# Patient Record
Sex: FEMALE | Race: BLACK OR AFRICAN AMERICAN | Employment: OTHER
[De-identification: names, ages, dates, MRNs, and addresses within clinical notes are randomized per-mention and may not be internally consistent; named-entity substitution may affect disease eponyms.]

---

## 2022-09-27 ENCOUNTER — OFFICE VISIT (OUTPATIENT)
Dept: FAMILY MEDICINE CLINIC | Facility: CLINIC | Age: 37
End: 2022-09-27
Payer: MEDICARE

## 2022-09-27 VITALS
WEIGHT: 171 LBS | HEART RATE: 69 BPM | DIASTOLIC BLOOD PRESSURE: 78 MMHG | HEIGHT: 67 IN | SYSTOLIC BLOOD PRESSURE: 112 MMHG | TEMPERATURE: 97.3 F | OXYGEN SATURATION: 99 % | BODY MASS INDEX: 26.84 KG/M2

## 2022-09-27 DIAGNOSIS — E20.9 HYPOPARATHYROIDISM, UNSPECIFIED HYPOPARATHYROIDISM TYPE (HCC): ICD-10-CM

## 2022-09-27 DIAGNOSIS — G89.29 OTHER CHRONIC PAIN: ICD-10-CM

## 2022-09-27 DIAGNOSIS — G35 MULTIPLE SCLEROSIS (HCC): Primary | ICD-10-CM

## 2022-09-27 DIAGNOSIS — E03.9 HYPOTHYROIDISM, UNSPECIFIED TYPE: ICD-10-CM

## 2022-09-27 DIAGNOSIS — M25.559 HIP PAIN: ICD-10-CM

## 2022-09-27 DIAGNOSIS — R56.9 SEIZURES (HCC): ICD-10-CM

## 2022-09-27 PROCEDURE — 99204 OFFICE O/P NEW MOD 45 MIN: CPT | Performed by: FAMILY MEDICINE

## 2022-09-27 PROCEDURE — G8419 CALC BMI OUT NRM PARAM NOF/U: HCPCS | Performed by: FAMILY MEDICINE

## 2022-09-27 PROCEDURE — G8427 DOCREV CUR MEDS BY ELIG CLIN: HCPCS | Performed by: FAMILY MEDICINE

## 2022-09-27 PROCEDURE — 1036F TOBACCO NON-USER: CPT | Performed by: FAMILY MEDICINE

## 2022-09-27 RX ORDER — LEVETIRACETAM 500 MG/1
500 TABLET ORAL 2 TIMES DAILY
Qty: 60 TABLET | Refills: 3 | Status: SHIPPED | OUTPATIENT
Start: 2022-09-27

## 2022-09-27 RX ORDER — DIPHENHYDRAMINE HCL 25 MG
CAPSULE ORAL
COMMUNITY
Start: 2015-03-29

## 2022-09-27 RX ORDER — CALCITRIOL 0.25 UG/1
0.5 CAPSULE, LIQUID FILLED ORAL 2 TIMES DAILY
Qty: 120 CAPSULE | Refills: 3 | Status: SHIPPED | OUTPATIENT
Start: 2022-09-27

## 2022-09-27 RX ORDER — ZOLPIDEM TARTRATE 10 MG/1
TABLET ORAL
COMMUNITY

## 2022-09-27 RX ORDER — ONDANSETRON 4 MG/1
TABLET, ORALLY DISINTEGRATING ORAL
COMMUNITY
End: 2022-10-17 | Stop reason: SDUPTHER

## 2022-09-27 RX ORDER — CALCIUM CARBONATE/VITAMIN D3 500 MG-600
TABLET,CHEWABLE ORAL
COMMUNITY

## 2022-09-27 RX ORDER — CALCITRIOL 0.25 UG/1
0.5 CAPSULE, LIQUID FILLED ORAL 2 TIMES DAILY
COMMUNITY
Start: 2022-08-06 | End: 2022-09-27 | Stop reason: SDUPTHER

## 2022-09-27 RX ORDER — TIZANIDINE 4 MG/1
4 TABLET ORAL NIGHTLY PRN
COMMUNITY
Start: 2021-11-23 | End: 2022-09-27 | Stop reason: SDUPTHER

## 2022-09-27 RX ORDER — TIZANIDINE 4 MG/1
4 TABLET ORAL EVERY 8 HOURS PRN
Qty: 30 TABLET | Refills: 1 | Status: SHIPPED | OUTPATIENT
Start: 2022-09-27

## 2022-09-27 RX ORDER — LEVOTHYROXINE SODIUM 112 UG/1
112 TABLET ORAL DAILY
Qty: 30 TABLET | Refills: 5 | Status: SHIPPED | OUTPATIENT
Start: 2022-09-27

## 2022-09-27 RX ORDER — OXYCODONE HYDROCHLORIDE 10 MG/1
10 TABLET ORAL
COMMUNITY
Start: 2021-11-23

## 2022-09-27 RX ORDER — PSEUDOEPHEDRINE HCL 30 MG
TABLET ORAL
COMMUNITY
Start: 2015-03-29

## 2022-09-27 RX ORDER — LEVETIRACETAM 500 MG/1
500 TABLET ORAL 2 TIMES DAILY
COMMUNITY
Start: 2022-08-06 | End: 2022-09-27 | Stop reason: SDUPTHER

## 2022-09-27 RX ORDER — ERGOCALCIFEROL (VITAMIN D2) 1250 MCG
CAPSULE ORAL
COMMUNITY

## 2022-09-27 RX ORDER — LEVOTHYROXINE SODIUM 112 UG/1
112 TABLET ORAL DAILY
COMMUNITY
Start: 2021-08-17 | End: 2022-09-27 | Stop reason: SDUPTHER

## 2022-09-27 ASSESSMENT — PATIENT HEALTH QUESTIONNAIRE - PHQ9
SUM OF ALL RESPONSES TO PHQ QUESTIONS 1-9: 0
1. LITTLE INTEREST OR PLEASURE IN DOING THINGS: 0
SUM OF ALL RESPONSES TO PHQ QUESTIONS 1-9: 0
2. FEELING DOWN, DEPRESSED OR HOPELESS: 0
SUM OF ALL RESPONSES TO PHQ QUESTIONS 1-9: 0
SUM OF ALL RESPONSES TO PHQ QUESTIONS 1-9: 0
SUM OF ALL RESPONSES TO PHQ9 QUESTIONS 1 & 2: 0

## 2022-09-27 ASSESSMENT — ENCOUNTER SYMPTOMS
SHORTNESS OF BREATH: 0
COUGH: 0
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
## 2022-09-28 ENCOUNTER — TELEPHONE (OUTPATIENT)
Dept: FAMILY MEDICINE CLINIC | Facility: CLINIC | Age: 37
End: 2022-09-28

## 2022-10-12 ENCOUNTER — TELEPHONE (OUTPATIENT)
Dept: FAMILY MEDICINE CLINIC | Facility: CLINIC | Age: 37
End: 2022-10-12

## 2022-10-12 DIAGNOSIS — G89.29 OTHER CHRONIC PAIN: Primary | ICD-10-CM

## 2022-10-12 NOTE — TELEPHONE ENCOUNTER
----- Message from Gayla Fox sent at 10/11/2022 11:44 AM EDT -----  Subject: Message to Provider    QUESTIONS  Information for Provider? Patient is wanting a referral for [facility]   comprehensive pain in [place], their number is [phone] and their   fax number is [phone]. Patient already has a referral but is   wanting a new referral because the other referral is not covered by her   insurance. Please call patient back to discuss. ---------------------------------------------------------------------------  --------------  Juan ALONSO  [phone]; OK to leave message on voicemail  ---------------------------------------------------------------------------  --------------  SCRIPT ANSWERS  Relationship to Patient?  Self

## 2022-10-12 NOTE — TELEPHONE ENCOUNTER
----- Message from Jerome Leroy sent at 10/11/2022 11:44 AM EDT -----  Subject: Message to Provider    QUESTIONS  Information for Provider? Patient is wanting a referral for Mio Stevenson   [facility] in [place], their number is [phone] and their   fax number is [phone]. Patient already has a referral but is   wanting a new referral because the other referral is not covered by her   insurance. Please call patient back to discuss. ---------------------------------------------------------------------------  --------------  Mike ROSE  [phone]; OK to leave message on voicemail  ---------------------------------------------------------------------------  --------------  SCRIPT ANSWERS  Relationship to Patient?  Self

## 2022-10-14 ENCOUNTER — TELEMEDICINE (OUTPATIENT)
Dept: FAMILY MEDICINE CLINIC | Facility: CLINIC | Age: 37
End: 2022-10-14
Payer: MEDICARE
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
## 2022-10-14 PROCEDURE — 99214 OFFICE O/P EST MOD 30 MIN: CPT | Performed by: NURSE PRACTITIONER

## 2022-10-14 RX ORDER — METHOCARBAMOL 500 MG/1
500 TABLET, FILM COATED ORAL 4 TIMES DAILY
Qty: 40 TABLET | Refills: 0 | Status: CANCELLED | OUTPATIENT
Start: 2022-10-14 | End: 2022-10-24

## 2022-10-14 RX ORDER — CYCLOBENZAPRINE HCL 10 MG
10 TABLET ORAL 3 TIMES DAILY PRN
Qty: 20 TABLET | Refills: 0 | Status: SHIPPED | OUTPATIENT
Start: 2022-10-14 | End: 2022-10-24

## 2022-10-14 ASSESSMENT — ENCOUNTER SYMPTOMS
SHORTNESS OF BREATH: 0
COUGH: 0
BACK PAIN: 0
CONSTIPATION: 0
DIARRHEA: 0
ABDOMINAL PAIN: 0
SORE THROAT: 0
VOMITING: 0
NAUSEA: 0
EYE PAIN: 0
SINUS PAIN: 0
WHEEZING: 0

## 2022-10-14 ASSESSMENT — PATIENT HEALTH QUESTIONNAIRE - PHQ9
SUM OF ALL RESPONSES TO PHQ QUESTIONS 1-9: 0
1. LITTLE INTEREST OR PLEASURE IN DOING THINGS: 0
SUM OF ALL RESPONSES TO PHQ9 QUESTIONS 1 & 2: 0
SUM OF ALL RESPONSES TO PHQ QUESTIONS 1-9: 0
2. FEELING DOWN, DEPRESSED OR HOPELESS: 0
SUM OF ALL RESPONSES TO PHQ QUESTIONS 1-9: 0
SUM OF ALL RESPONSES TO PHQ QUESTIONS 1-9: 0
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
## 2022-10-14 NOTE — Clinical Note
Patient will likely ask for narcotics on Monday. You may want to look at her ER visit at [facility] 10/6/22.   FAM Eaton - CNP

## 2022-10-17 ENCOUNTER — OFFICE VISIT (OUTPATIENT)
Dept: FAMILY MEDICINE CLINIC | Facility: CLINIC | Age: 37
End: 2022-10-17
Payer: MEDICARE

## 2022-10-17 VITALS
DIASTOLIC BLOOD PRESSURE: 78 MMHG | BODY MASS INDEX: 26.49 KG/M2 | RESPIRATION RATE: 14 BRPM | OXYGEN SATURATION: 100 % | HEART RATE: 67 BPM | SYSTOLIC BLOOD PRESSURE: 110 MMHG | HEIGHT: 67 IN | TEMPERATURE: 98.7 F | WEIGHT: 168.8 LBS

## 2022-10-17 DIAGNOSIS — R93.7 ABNORMAL MRI, LUMBAR SPINE: ICD-10-CM

## 2022-10-17 DIAGNOSIS — G89.29 OTHER CHRONIC PAIN: ICD-10-CM

## 2022-10-17 DIAGNOSIS — E20.9 HYPOPARATHYROIDISM, UNSPECIFIED HYPOPARATHYROIDISM TYPE (HCC): ICD-10-CM

## 2022-10-17 DIAGNOSIS — Z12.4 CERVICAL CANCER SCREENING: ICD-10-CM

## 2022-10-17 DIAGNOSIS — R11.0 NAUSEA: ICD-10-CM

## 2022-10-17 DIAGNOSIS — H57.89 IRRITATION OF RIGHT EYE: Primary | ICD-10-CM

## 2022-10-17 DIAGNOSIS — R91.8 MULTIPLE LUNG NODULES ON CT: ICD-10-CM

## 2022-10-17 PROCEDURE — 99215 OFFICE O/P EST HI 40 MIN: CPT | Performed by: FAMILY MEDICINE

## 2022-10-17 RX ORDER — ONDANSETRON 4 MG/1
TABLET, ORALLY DISINTEGRATING ORAL
Qty: 20 TABLET | Refills: 0 | Status: SHIPPED | OUTPATIENT
Start: 2022-10-17 | End: 2022-10-17 | Stop reason: SDUPTHER

## 2022-10-17 RX ORDER — ONDANSETRON 4 MG/1
4 TABLET, ORALLY DISINTEGRATING ORAL EVERY 8 HOURS PRN
Qty: 20 TABLET | Refills: 0 | Status: SHIPPED | OUTPATIENT
Start: 2022-10-17

## 2022-10-17 ASSESSMENT — PATIENT HEALTH QUESTIONNAIRE - PHQ9
SUM OF ALL RESPONSES TO PHQ9 QUESTIONS 1 & 2: 0
2. FEELING DOWN, DEPRESSED OR HOPELESS: 0
SUM OF ALL RESPONSES TO PHQ QUESTIONS 1-9: 0
1. LITTLE INTEREST OR PLEASURE IN DOING THINGS: 0
SUM OF ALL RESPONSES TO PHQ QUESTIONS 1-9: 0

## 2022-10-17 ASSESSMENT — ANXIETY QUESTIONNAIRES
2. NOT BEING ABLE TO STOP OR CONTROL WORRYING: 0
GAD7 TOTAL SCORE: 0
3. WORRYING TOO MUCH ABOUT DIFFERENT THINGS: 0
5. BEING SO RESTLESS THAT IT IS HARD TO SIT STILL: 0
7. FEELING AFRAID AS IF SOMETHING AWFUL MIGHT HAPPEN: 0
4. TROUBLE RELAXING: 0
6. BECOMING EASILY ANNOYED OR IRRITABLE: 0
1. FEELING NERVOUS, ANXIOUS, OR ON EDGE: 0

## 2022-10-18 ENCOUNTER — TELEPHONE (OUTPATIENT)
Dept: FAMILY MEDICINE CLINIC | Facility: CLINIC | Age: 37
End: 2022-10-18

## 2022-10-18 NOTE — TELEPHONE ENCOUNTER
----- Message from Asif Doc sent at 10/18/2022  8:59 AM EDT -----  Subject: Message to Provider    QUESTIONS  Information for Provider? Patient is calling and needs a call back to   discuss the 2 referrals she has for pain management. Please call back to   as soon as possible  ---------------------------------------------------------------------------  --------------  [id number] [employer]  [phone]; OK to leave message on voicemail  ---------------------------------------------------------------------------  --------------  SCRIPT ANSWERS  Relationship to Patient?  Self

## 2022-10-18 NOTE — TELEPHONE ENCOUNTER
----- Message from Diamond Whitaker sent at 10/18/2022  8:59 AM EDT -----  Subject: Message to Provider    QUESTIONS  Information for Provider? Patient is calling and needs a call back to   discuss the 2 referrals she has for pain management. Please call back to   as soon as possible  ---------------------------------------------------------------------------  --------------  [id number] [employer]  [phone]; OK to leave message on voicemail  ---------------------------------------------------------------------------  --------------  SCRIPT ANSWERS  Relationship to Patient?  Self
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
## 2022-10-27 LAB
CYTOLOGIST CVX/VAG CYTO: NORMAL
HPV OTHER HR TYPES: NEGATIVE
HPV TYPE 16: NEGATIVE
HPV TYPE 18: NEGATIVE
PATH REPORT.FINAL DX SPEC: NORMAL
STAT OF ADQ CVX/VAG CYTO-IMP: NORMAL

## 2022-11-08 ENCOUNTER — TELEPHONE (OUTPATIENT)
Dept: FAMILY MEDICINE CLINIC | Facility: CLINIC | Age: 37
End: 2022-11-08

## 2022-11-08 NOTE — TELEPHONE ENCOUNTER
Patient's friend had a [employer] message  stating that she was having seizures several times a day. She was informed to take the patient to the hospital.      The patient refused to go to the [place] [address].       The patient's friend stating on a [employer] message to make appointment. A vm and [employer] message was left for the patient and the patient's friend to call the office to make an appointment.

## 2022-12-22 ENCOUNTER — TELEPHONE (OUTPATIENT)
Dept: FAMILY MEDICINE CLINIC | Facility: CLINIC | Age: 37
End: 2022-12-22

## 2022-12-22 NOTE — TELEPHONE ENCOUNTER
Patient had a friend calling regarding the patient's seizures. .   Patient was informed via [employer] to make an appointment. Her Pain doctor stated she needed more calcium and vitamin D3  She has been taking this medication and the seizure have subsided.

## 2022-12-22 NOTE — TELEPHONE ENCOUNTER
Patient states that her pain doctor found a nodule on her back. She states that her toes are now numb on the left side. She is inquiring if she needs to go to ortho. Also wants a dental surgeon. Appt made on 12/28/22 with Dr. Nirali Martinez.

## 2023-05-01 DIAGNOSIS — E20.9 HYPOPARATHYROIDISM, UNSPECIFIED HYPOPARATHYROIDISM TYPE (HCC): ICD-10-CM

## 2023-05-01 RX ORDER — CALCITRIOL 0.25 UG/1
0.5 CAPSULE, LIQUID FILLED ORAL 2 TIMES DAILY
Qty: 120 CAPSULE | Refills: 0 | Status: SHIPPED | OUTPATIENT
Start: 2023-05-01

## 2023-05-01 NOTE — TELEPHONE ENCOUNTER
Pt is requesting a refill calcitRIOL ( ROCALTROL) 0.25    Pharmacy [facility]

## 2023-10-03 ENCOUNTER — TELEMEDICINE (OUTPATIENT)
Dept: FAMILY MEDICINE CLINIC | Facility: CLINIC | Age: 38
End: 2023-10-03

## 2023-10-03 DIAGNOSIS — R56.9 SEIZURES (HCC): ICD-10-CM

## 2023-10-03 DIAGNOSIS — R91.8 MULTIPLE LUNG NODULES ON CT: ICD-10-CM

## 2023-10-03 DIAGNOSIS — G35 MULTIPLE SCLEROSIS (HCC): Primary | ICD-10-CM

## 2023-10-03 DIAGNOSIS — E20.9 HYPOPARATHYROIDISM, UNSPECIFIED HYPOPARATHYROIDISM TYPE (HCC): ICD-10-CM

## 2023-10-03 PROCEDURE — 99213 OFFICE O/P EST LOW 20 MIN: CPT | Performed by: FAMILY MEDICINE

## 2023-10-03 RX ORDER — CALCITRIOL 0.25 UG/1
0.5 CAPSULE, LIQUID FILLED ORAL 2 TIMES DAILY
Qty: 360 CAPSULE | Refills: 5 | Status: SHIPPED | OUTPATIENT
Start: 2023-10-03

## 2023-10-03 RX ORDER — CALCITRIOL 0.25 UG/1
0.5 CAPSULE, LIQUID FILLED ORAL 2 TIMES DAILY
Qty: 360 CAPSULE | Refills: 5 | Status: SHIPPED | OUTPATIENT
Start: 2023-10-03 | End: 2023-10-03

## 2023-10-03 ASSESSMENT — ENCOUNTER SYMPTOMS
EYES NEGATIVE: 1
RESPIRATORY NEGATIVE: 1
ALLERGIC/IMMUNOLOGIC NEGATIVE: 1
GASTROINTESTINAL NEGATIVE: 1

## 2023-10-03 NOTE — PROGRESS NOTES
equal, round, and reactive to light. Cardiovascular:      Pulses: Normal pulses. Pulmonary:      Effort: Pulmonary effort is normal.   Musculoskeletal:         General: Normal range of motion. Cervical back: Normal range of motion and neck supple. Skin:     General: Skin is warm and dry. Neurological:      General: No focal deficit present. Mental Status: She is alert and oriented to person, place, and time. Psychiatric:         Mood and Affect: Mood normal.         On this date 10/03/23  I have spent 20 minutes reviewing previous notes, test results and face to face with the patient discussing the diagnosis and importance of compliance with the treatment plan as well as documenting on the day of the visit. Ankita Hermosillo is being evaluated by a Virtual Visit (video visit) encounter to address concerns as mentioned above. A caregiver was present when appropriate. Due to this being a TeleHealth encounter (During AXAMZ-40 public health emergency), evaluation of the following organ systems was limited: Vitals/Constitutional/EENT/Resp/CV/GI//MS/Neuro/Skin/Heme-Lymph-Imm. Pursuant to the emergency declaration under the [address] and the "[employer] and [employer] Act, this Virtual Visit was conducted with patient's (and/or legal guardian's) consent, to reduce the patient's risk of exposure to COVID-19 and provide necessary medical care. The patient (and/or legal guardian) has also been advised to contact this office for worsening conditions or problems, and seek emergency medical treatment and/or call 911 if deemed necessary. Patient identification was verified at the start of the visit: Yes    Services were provided through a video synchronous discussion virtually to substitute for in-person clinic visit. Patient and provider were located at their individual homes.     An electronic

## 2023-11-28 ENCOUNTER — TELEPHONE (OUTPATIENT)
Dept: FAMILY MEDICINE CLINIC | Facility: CLINIC | Age: 38
End: 2023-11-28
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
## 2023-11-28 NOTE — TELEPHONE ENCOUNTER
Patient was called and informed that the visit is a physical and those cannot be done virtually. She was also informed that there appointment was on 12/19/23 at 3:30 pm.     Patient voiced understanding.

## 2024-12-11 ENCOUNTER — TELEPHONE (OUTPATIENT)
Dept: FAMILY MEDICINE CLINIC | Facility: CLINIC | Age: 39
End: 2024-12-11

## 2024-12-11 NOTE — TELEPHONE ENCOUNTER
----- Message from Radha SARINA sent at 12/9/2024 10:17 AM EST -----  Regarding: [facility] Appointment Request  E[facility] Appointment Request    Patient needs appointment for [facility] Appointment Type: Existing Condition Follow Up.    Patient Requested Dates(s): As soon as possible.   Patient Requested Time: After 10 AM.   Provider Name: Dian Jeffries MD    Reason for Appointment Request: Established Patient - Available appointments did not meet patient need    Referral for Pain Management, also obtaining other information from her PCP.   --------------------------------------------------------------------------------------------------------------------------    Relationship to Patient: Self     Call Back Information: OK to leave message on voicemail  Preferred Call Back Number: Phone  [phone]

## 2024-12-13 ENCOUNTER — TELEPHONE (OUTPATIENT)
Dept: FAMILY MEDICINE CLINIC | Facility: CLINIC | Age: 39
End: 2024-12-13

## 2024-12-13 NOTE — TELEPHONE ENCOUNTER
VM left for the patient to make an appointment by calling the office ore through [employer].  Message also sent through [employer].

## 2024-12-13 NOTE — TELEPHONE ENCOUNTER
----- Message from Radha SARINA sent at 12/9/2024 10:17 AM EST -----  Regarding: ECC Appointment Request  ECC Appointment Request    Patient needs appointment for ECC Appointment Type: Existing Condition Follow Up.    Patient Requested Dates(s): As soon as possible.   Patient Requested Time: After 10 AM.   Provider Name: Dian Jeffries MD    Reason for Appointment Request: Established Patient - Available appointments did not meet patient need    Referral for Pain Management, also obtaining other information from her PCP.   --------------------------------------------------------------------------------------------------------------------------    Relationship to Patient: Self     Call Back Information: OK to leave message on voicemail  Preferred Call Back Number: Phone  [phone]

## 2024-12-17 ENCOUNTER — TELEPHONE (OUTPATIENT)
Dept: FAMILY MEDICINE CLINIC | Facility: CLINIC | Age: 39
End: 2024-12-17

## 2024-12-17 NOTE — TELEPHONE ENCOUNTER
Tried to call the patient.  VM is full.  Mychart left for the patient to make an appointment by calling the office or through [email or web address].

## 2024-12-17 NOTE — TELEPHONE ENCOUNTER
----- Message from Radha SARINA sent at 12/9/2024 10:17 AM EST -----  Regarding: ECC Appointment Request  ECC Appointment Request    Patient needs appointment for ECC Appointment Type: Existing Condition Follow Up.    Patient Requested Dates(s): As soon as possible.   Patient Requested Time: After 10 AM.   Provider Name: Dian Jeffries MD    Reason for Appointment Request: Established Patient - Available appointments did not meet patient need    Referral for Pain Management, also obtaining other information from her PCP.   --------------------------------------------------------------------------------------------------------------------------    Relationship to Patient: Self     Call Back Information: OK to leave message on voicemail  Preferred Call Back Number: Phone  [phone]

## 2024-12-27 ENCOUNTER — TELEPHONE (OUTPATIENT)
Dept: FAMILY MEDICINE CLINIC | Facility: CLINIC | Age: 39
End: 2024-12-27

## 2024-12-27 NOTE — TELEPHONE ENCOUNTER
Patient has an appt with you on 12/31/24.  She has been having seizures. Went to the er. She has been out fo her calcium and thinks it might be her calcium levels.       Calcitrol 0.25 mcg 2 caps two times a day.  Last filled on 10/03/23 with 260 and 5 refills.      She should have until her appointment with the provider.      MA called the patient to see if she had enough.  VM full.

## 2025-01-03 DIAGNOSIS — E20.9 HYPOPARATHYROIDISM, UNSPECIFIED HYPOPARATHYROIDISM TYPE (HCC): ICD-10-CM

## 2025-01-03 RX ORDER — CALCITRIOL 0.25 UG/1
0.5 CAPSULE, LIQUID FILLED ORAL 2 TIMES DAILY
Qty: 360 CAPSULE | Refills: 5 | Status: SHIPPED | OUTPATIENT
Start: 2025-01-03

## 2025-01-06 ENCOUNTER — OFFICE VISIT (OUTPATIENT)
Dept: FAMILY MEDICINE CLINIC | Facility: CLINIC | Age: 40
End: 2025-01-06

## 2025-01-06 VITALS
BODY MASS INDEX: 42.91 KG/M2 | TEMPERATURE: 98.3 F | SYSTOLIC BLOOD PRESSURE: 114 MMHG | WEIGHT: 273.38 LBS | OXYGEN SATURATION: 99 % | HEIGHT: 67 IN | DIASTOLIC BLOOD PRESSURE: 84 MMHG | HEART RATE: 84 BPM

## 2025-01-06 DIAGNOSIS — R63.5 WEIGHT GAIN: ICD-10-CM

## 2025-01-06 DIAGNOSIS — Z98.890 HISTORY OF PARATHYROIDECTOMY: ICD-10-CM

## 2025-01-06 DIAGNOSIS — R91.8 MULTIPLE PULMONARY NODULES DETERMINED BY COMPUTED TOMOGRAPHY OF LUNG: ICD-10-CM

## 2025-01-06 DIAGNOSIS — R79.89 ELEVATED TSH: ICD-10-CM

## 2025-01-06 DIAGNOSIS — E89.0 HISTORY OF TOTAL THYROIDECTOMY: ICD-10-CM

## 2025-01-06 DIAGNOSIS — G89.21 CHRONIC PAIN DUE TO TRAUMA: ICD-10-CM

## 2025-01-06 DIAGNOSIS — Z90.89 HISTORY OF PARATHYROIDECTOMY: ICD-10-CM

## 2025-01-06 DIAGNOSIS — E89.0 POSTOPERATIVE HYPOTHYROIDISM: ICD-10-CM

## 2025-01-06 DIAGNOSIS — G35 MULTIPLE SCLEROSIS (HCC): ICD-10-CM

## 2025-01-06 DIAGNOSIS — E83.51 HYPOCALCEMIA: Primary | ICD-10-CM

## 2025-01-06 PROBLEM — R89.8 ABNORMAL BONE MARROW EXAMINATION: Status: ACTIVE | Noted: 2023-05-23

## 2025-01-06 LAB
ALBUMIN SERPL-MCNC: 3.9 G/DL (ref 3.5–5)
ALBUMIN/GLOB SERPL: 1 (ref 1–1.9)
ALP SERPL-CCNC: 87 U/L (ref 35–104)
ALT SERPL-CCNC: 11 U/L (ref 8–45)
ANION GAP SERPL CALC-SCNC: 13 MMOL/L (ref 7–16)
AST SERPL-CCNC: 27 U/L (ref 15–37)
BILIRUB SERPL-MCNC: 0.3 MG/DL (ref 0–1.2)
BUN SERPL-MCNC: 11 MG/DL (ref 6–23)
CALCIUM SERPL-MCNC: 7.4 MG/DL (ref 8.8–10.2)
CHLORIDE SERPL-SCNC: 99 MMOL/L (ref 98–107)
CO2 SERPL-SCNC: 27 MMOL/L (ref 20–29)
CREAT SERPL-MCNC: 1.08 MG/DL (ref 0.6–1.1)
GLOBULIN SER CALC-MCNC: 4 G/DL (ref 2.3–3.5)
GLUCOSE SERPL-MCNC: 99 MG/DL (ref 70–99)
POTASSIUM SERPL-SCNC: 4.2 MMOL/L (ref 3.5–5.1)
PROT SERPL-MCNC: 7.8 G/DL (ref 6.3–8.2)
SODIUM SERPL-SCNC: 139 MMOL/L (ref 136–145)
TSH W FREE THYROID IF ABNORMAL: 3.41 UIU/ML (ref 0.27–4.2)

## 2025-01-06 PROCEDURE — 99215 OFFICE O/P EST HI 40 MIN: CPT

## 2025-01-06 RX ORDER — CYCLOBENZAPRINE HCL 10 MG
TABLET ORAL
COMMUNITY
Start: 2024-12-29

## 2025-01-06 RX ORDER — OXYCODONE HYDROCHLORIDE 10 MG/1
10 TABLET ORAL
Qty: 30 TABLET | Refills: 0 | Status: SHIPPED | OUTPATIENT
Start: 2025-01-06 | End: 2025-01-11

## 2025-01-06 RX ORDER — BACLOFEN 10 MG/1
TABLET ORAL
COMMUNITY
Start: 2024-12-29

## 2025-01-06 RX ORDER — BUPRENORPHINE 7.5 UG/H
PATCH TRANSDERMAL
COMMUNITY
Start: 2024-12-26

## 2025-01-06 SDOH — ECONOMIC STABILITY: FOOD INSECURITY: WITHIN THE PAST 12 MONTHS, THE FOOD YOU BOUGHT JUST DIDN'T LAST AND YOU DIDN'T HAVE MONEY TO GET MORE.: PATIENT DECLINED

## 2025-01-06 SDOH — ECONOMIC STABILITY: INCOME INSECURITY: HOW HARD IS IT FOR YOU TO PAY FOR THE VERY BASICS LIKE FOOD, HOUSING, MEDICAL CARE, AND HEATING?: PATIENT DECLINED

## 2025-01-06 SDOH — ECONOMIC STABILITY: FOOD INSECURITY: WITHIN THE PAST 12 MONTHS, YOU WORRIED THAT YOUR FOOD WOULD RUN OUT BEFORE YOU GOT MONEY TO BUY MORE.: PATIENT DECLINED

## 2025-01-06 ASSESSMENT — PATIENT HEALTH QUESTIONNAIRE - PHQ9
SUM OF ALL RESPONSES TO PHQ QUESTIONS 1-9: 0
2. FEELING DOWN, DEPRESSED OR HOPELESS: NOT AT ALL
SUM OF ALL RESPONSES TO PHQ9 QUESTIONS 1 & 2: 0
SUM OF ALL RESPONSES TO PHQ QUESTIONS 1-9: 0
SUM OF ALL RESPONSES TO PHQ QUESTIONS 1-9: 0
1. LITTLE INTEREST OR PLEASURE IN DOING THINGS: NOT AT ALL
SUM OF ALL RESPONSES TO PHQ QUESTIONS 1-9: 0

## 2025-01-06 NOTE — PROGRESS NOTES
Suzan Sanford (: 1985) is a 39 y.o. female  established patient, here for evaluation of the following chief complaint(s):  No chief complaint on file.         ASSESSMENT/PLAN:  There are no diagnoses linked to this encounter.    Continues on listed meds without difficulty.              No colonoscopy on file     No breast cancer screening on file     Date of last Cervical Cancer screen (HPV or PAP): 10/17/2022   Last Menses was on/around: ***      Reports good diet, regular exercise.     I reviewed recent lab results w/  Ms. Sanford.    ***      PHQ screening is not concerning for depression.   ***    Anticipatory Guidance discussed for the following: Family Problems, Diet & exercise, Substance abuse (none), sexual practices, injury prevention and dental health.      Follow Up    No follow-ups on file.      SUBJECTIVE/OBJECTIVE:    HPI      At her visit On 10/3/23, the following was discussed:     She was seen virtually for med refill for Calcitriol. Hx of thyroidectomy/parathyroidectomy, prone to hypocalcemic seizures. She was following with Oncology for lung nodules. She was supposed to return 2 months later for a physical.     At today's visit:     Here for physical today. This is my first time meeting Ms. Sanford.     She had an ER visit in 3/26/24 for seizures. Found to have low calcium level.     She saw Dr. Sanchez on 23 for multiple pulmonary nodules. Noted that she had her thyroid/parathyroid removed back in . No other hx of CA. PET was completed prior to this visit with minimal activity. PET also showed \"There is activity in the neck at the thoracic inlet on the left side. Activity in the left adnexa as well.\"    CT neck completed afterwards shows the following:    \"Lesion embedded in the left sternothyroid muscle is seen. Corresponds with the hypermetabolic activity on the PET scan. Hyperdense nodule anterior to the thyroid gland is seen. Hyperdense nodules are seen in the thyroid bed as

## 2025-01-06 NOTE — PROGRESS NOTES
Suzan Sanford (: 1985) is a 39 y.o. female, established patient, here for evaluation of the following chief complaint(s):  Other (Needs referral to neurology for seizures/MS. Would also like pain management referral. Would sugey OBGYN ref) and Cough (Coughing up thick mucus x1.5 wks)       ASSESSMENT/PLAN:  1. Hypocalcemia  -     Comprehensive Metabolic Panel; Future  2. Multiple sclerosis (HCC)  -     [facility]  3. Multiple pulmonary nodules determined by computed tomography of lung  -     [facility]  4. Chronic pain due to trauma  -     [facility]  -     oxyCODONE HCl (OXY-IR) 10 MG immediate release tablet; Take 1 tablet by mouth every 4-6 hours as needed for Pain for up to 5 days. Max Daily Amount: 60 mg, Disp-30 tablet, R-0Normal  5. Weight gain  -     TSH reflex to FT4; Future  6. Elevated TSH  -     TSH reflex to FT4; Future  7. History of total thyroidectomy  8. History of parathyroidectomy  9. Postoperative hypothyroidism    Obtaining Ca, may need dosage adjustment  Neurology referral for MS mgt  Pain Mgt referral for chronic pain, refilled Oxy for 5 days  Pulm referral for pulmonary nodules  Repeating TSH today   F/U in 3 months or sooner pending lab studies.     SUBJECTIVE/OBJECTIVE:  HPI    Review of notes showed the following:     She had an ER visit in 3/26/24 for seizures. Found to have low calcium level.     She saw Dr. Sanchez on 23 for multiple pulmonary nodules. Noted that she had her thyroid/parathyroid removed back in . No other hx of CA. PET was completed prior to this visit with minimal activity. PET also showed \"There is activity in the neck at the thoracic inlet on the left side. Activity in the left adnexa as well.\"    CT neck completed afterwards shows the following:    \"Lesion embedded in the left sternothyroid muscle is seen. Corresponds with the hypermetabolic activity on

## 2025-01-07 NOTE — RESULT ENCOUNTER NOTE
Please call and let patient know her calcium is low at 7.4. She needs to start taking her Calcitriol 0.25 mcg tablets as 2 capsules twice daily. She may want to take this with a meal to reduce GI upset. We will repeat this at her follow up visit.     Her thyroid level was normal.

## 2025-01-09 ENCOUNTER — TELEPHONE (OUTPATIENT)
Dept: PULMONOLOGY | Age: 40
End: 2025-01-09

## 2025-01-09 DIAGNOSIS — R91.8 PULMONARY NODULES: Primary | ICD-10-CM

## 2025-01-09 NOTE — TELEPHONE ENCOUNTER
Note:  Ref by FAM [facility]ado for multiple pulmonary nodules. Imaging requested to PACS CT chest 12/5/23 6/1/23 PET/CT 7/14/23. Review with triage for additional imaging needs. Last CT was over 1 yr ago.        Patient is shown to Dr Sanchez, thoracic surgery and Dr Omalley, medical oncology at [facility], HX MS, seizures, ultrasound-guided biopsy of her neck lesion. This came back consistent with thyroid tissue with atypia.  Last seen 12/20/2023-Plan for right VATS wedge resection. Follow-up 2 weeks after surgery     Dr Moran-reviewed EPIC and CareEverywhere and do not see where patient ever completed VATS.  She has now been referred to [facility] but has not had updated imaging since 12/5/2023.  Please advise triage if patient needs updated imaging or if I need to contact Dr Sanchez's office to get her back in with him.

## 2025-01-09 NOTE — TELEPHONE ENCOUNTER
Copied Dr Moran's response to triage in Pulmonary Comment.  I have left patient a message to call office. Per MD, PET scan followed by appointment.

## 2025-01-14 NOTE — TELEPHONE ENCOUNTER
Patient called back and left  324 pm. When call returned 454 she did not answer. I have asked that she call back tomorrow and ask for Adriana LOPEZ to set up PET and office visit.

## 2025-01-14 NOTE — TELEPHONE ENCOUNTER
No Answer. Message left by triage 1/9/25 1/10/25  Left 3rd voicemail 1/14/25 and mailed letter to home.   Deferring for 1 week before sending back to referring provider.     Needs PET prior to visit, has not yet been ordered.

## 2025-01-15 NOTE — TELEPHONE ENCOUNTER
I have spoken with patient.  She reports that she did not have prior lung biopsy due to her MS. She is agreeable to updated PET scan prior to appointment with this practice. She reports that she was told she did not need to schedule a follow up with Dr Sanchez.  Patient reports [employer] #[id number]. PET scan is scheduled on 1/21 and she is scheduled to see Dr Moran on 1/29 at 1000 arrival.  She will call with additional questions.

## 2025-01-22 ENCOUNTER — TELEPHONE (OUTPATIENT)
Dept: PULMONOLOGY | Age: 40
End: 2025-01-22

## 2025-01-22 NOTE — TELEPHONE ENCOUNTER
Called patient as she did not arrive for her PET scan on 1/21. She concluded call prior to discussion.

## 2025-02-04 ENCOUNTER — TELEPHONE (OUTPATIENT)
Dept: FAMILY MEDICINE CLINIC | Facility: CLINIC | Age: 40
End: 2025-02-04

## 2025-02-04 NOTE — TELEPHONE ENCOUNTER
Patient called and stated that the referral placed to have a test done the insurance will not cover the test.  Patient had been seeing a pain doctor and she is out of her oxycodone and is in severe pain. She stated her oxycodone was the 10's.    She would like it called into the [facility]. She is due for an appointment on 4/10/25 but she is willing to come into the office for an appointment if she needs too.

## 2025-02-06 ENCOUNTER — OFFICE VISIT (OUTPATIENT)
Dept: FAMILY MEDICINE CLINIC | Facility: CLINIC | Age: 40
End: 2025-02-06
Payer: MEDICAID

## 2025-02-06 VITALS
DIASTOLIC BLOOD PRESSURE: 90 MMHG | OXYGEN SATURATION: 98 % | HEIGHT: 67 IN | TEMPERATURE: 98.4 F | SYSTOLIC BLOOD PRESSURE: 118 MMHG | HEART RATE: 85 BPM | BODY MASS INDEX: 43.32 KG/M2 | WEIGHT: 276 LBS

## 2025-02-06 DIAGNOSIS — G89.4 CHRONIC PAIN SYNDROME: Primary | ICD-10-CM

## 2025-02-06 DIAGNOSIS — G35 MULTIPLE SCLEROSIS (HCC): ICD-10-CM

## 2025-02-06 DIAGNOSIS — G47.09 OTHER INSOMNIA: ICD-10-CM

## 2025-02-06 PROBLEM — Z87.898 HISTORY OF SEIZURES: Status: ACTIVE | Noted: 2023-10-03

## 2025-02-06 PROCEDURE — 99215 OFFICE O/P EST HI 40 MIN: CPT

## 2025-02-06 RX ORDER — AMITRIPTYLINE HYDROCHLORIDE 10 MG/1
10 TABLET ORAL NIGHTLY
Qty: 90 TABLET | Refills: 0 | Status: SHIPPED | OUTPATIENT
Start: 2025-02-06

## 2025-02-06 RX ORDER — CYCLOBENZAPRINE HCL 10 MG
10 TABLET ORAL 2 TIMES DAILY PRN
Qty: 60 TABLET | Refills: 0 | Status: SHIPPED | OUTPATIENT
Start: 2025-02-06

## 2025-02-06 SDOH — ECONOMIC STABILITY: FOOD INSECURITY: WITHIN THE PAST 12 MONTHS, THE FOOD YOU BOUGHT JUST DIDN'T LAST AND YOU DIDN'T HAVE MONEY TO GET MORE.: NEVER TRUE

## 2025-02-06 SDOH — ECONOMIC STABILITY: FOOD INSECURITY: WITHIN THE PAST 12 MONTHS, YOU WORRIED THAT YOUR FOOD WOULD RUN OUT BEFORE YOU GOT MONEY TO BUY MORE.: NEVER TRUE

## 2025-02-06 NOTE — PROGRESS NOTES
Suzan Sanford (: 1985) is a 39 y.o. female, established patient, here for evaluation of the following chief complaint(s):  Follow-up (Would like to discuss medications )       ASSESSMENT/PLAN:  1. Chronic pain syndrome  -     cyclobenzaprine (FLEXERIL) 10 MG tablet; Take 1 tablet by mouth 2 times daily as needed for Muscle spasms, Disp-60 tablet, R-0Normal  -     amitriptyline (ELAVIL) 10 MG tablet; Take 1 tablet by mouth nightly, Disp-90 tablet, R-0Normal  2. Multiple sclerosis (HCC)  -     DME Order for Bath/Shower Seat as OP  -     DME Order for (Specify) as OP  3. Other insomnia  -     amitriptyline (ELAVIL) 10 MG tablet; Take 1 tablet by mouth nightly, Disp-90 tablet, R-0Normal      SUBJECTIVE/OBJECTIVE:  HPI    Medication review:     I saw this patient for the first time on 25 and addressed multiple concerns. Referred to pulmonary for further consult regarding multiple pulmonary nodules and thyroid nodules seen on past imaging. Referred to pain mgt for chronic pain due to past orthopedic procedures due to a trauma. Per patient, she had been on Oxycodone chronically for this. I had supplied this for 5 days until she got in to see Pain Mgt. Also referred to Neurology for MS mgt along with seizures (possibly due to hypocalcemia).     It appears Pulmonary reached out to patient and set up PET scan prior to being seen. Patient did not show for PET scan. This may be what she is referring to in her phone call to the office regarding insurance not covering a \"test\".     At today's visit:     We had extensive conversations related to several questions today:     She was inquiring about a refill for her Oxycodone. She sees Pain Mgt in 4 days. I advised that I could not provide this however can refill her Flexeril for chronic pain/spasms that she experiences along her lumbar spine into her legs. She takes 10 mg BID PRN. Will provide this for 30 days only. She is no longer taking Baclofen or a pain patch.

## 2025-02-07 NOTE — PROGRESS NOTES
Previous tests/studies:   Study  Date  Results    MRI LUMBAR W WO CONTRAST 8/29/2023  Impression    1.  Unchanged size of the enhancing lesion in the right aspect of the L5 vertebral body with largely similar signal characteristics compared to the prior MRI. This lesion remains nonspecific with the differential diagnosis including atypical hemangioma.    2.  Unchanged additional smaller lesions in the anterior aspect of the S2 body and right sacral ala with similar signal characteristics to the L5 lesion.    3.  No new lesion or acute abnormality in the lumbar spine.    4.  Similar spondylosis from L4 through S1 with partial effacement of the left lateral recess at both levels and abutment of the respective descending left L5 and S1 nerve roots. Unchanged mild/moderate left neural foraminal stenosis at L4-L5.    Signed by: 8/29/2023 3:25 PM: Leno Milligan  Narrative    EXAM:  MRI lumbar spine with and without contrast    COMPARISON:  MRI 10/13/2022    INDICATION:  R89.8 Other abnormal findings in specimens from other organs, systems and tissues I10;  Not Validated    TECHNICAL: Multisequence, multiplanar MRI of the lumbar spine with and without contrast.    CONTRAST: 9  cc Gadolinium    FINDINGS:    The last well-formed disk is designated as L5-S1 for the purpose of this report. Vertebral bodies were numbered using this convention.    Lumbar spine alignment is within normal limits. Vertebral body heights are maintained. No acute fracture identified. Redemonstrated fairly well-circumscribed T1 and T2 hypointense lesion in the right aspect of the L5 vertebral body, overall unchanged in size measuring 12 x 11 x 13 mm by my measurements although the degree of T2 hypointensity appears somewhat more pronounced. A peripheral halo of T1 and T2 hyperintensity is again seen. The lesion is mildly STIR hyperintense and appears to enhance uniformly. Unchanged additional smaller although similar appearing lesions along the

## 2025-02-10 ENCOUNTER — OFFICE VISIT (OUTPATIENT)
Age: 40
End: 2025-02-10
Payer: MEDICARE

## 2025-02-10 DIAGNOSIS — M47.816 FACET ARTHROPATHY, LUMBAR: ICD-10-CM

## 2025-02-10 DIAGNOSIS — Z51.81 ENCOUNTER FOR THERAPEUTIC DRUG MONITORING: ICD-10-CM

## 2025-02-10 DIAGNOSIS — M54.16 RADICULOPATHY, LUMBAR REGION: ICD-10-CM

## 2025-02-10 DIAGNOSIS — M54.42 CHRONIC BILATERAL LOW BACK PAIN WITH LEFT-SIDED SCIATICA: ICD-10-CM

## 2025-02-10 DIAGNOSIS — G89.29 CHRONIC BILATERAL LOW BACK PAIN WITH LEFT-SIDED SCIATICA: ICD-10-CM

## 2025-02-10 DIAGNOSIS — G89.29 CHRONIC LEFT HIP PAIN: Primary | ICD-10-CM

## 2025-02-10 DIAGNOSIS — M25.552 CHRONIC LEFT HIP PAIN: Primary | ICD-10-CM

## 2025-02-10 PROCEDURE — 99204 OFFICE O/P NEW MOD 45 MIN: CPT | Performed by: ANESTHESIOLOGY

## 2025-02-10 RX ORDER — CELECOXIB 100 MG/1
100 CAPSULE ORAL 2 TIMES DAILY
Qty: 60 CAPSULE | Refills: 0 | Status: SHIPPED | OUTPATIENT
Start: 2025-02-10 | End: 2025-03-12

## 2025-02-10 ASSESSMENT — ENCOUNTER SYMPTOMS
ABDOMINAL PAIN: 0
BACK PAIN: 1
SHORTNESS OF BREATH: 0
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
## 2025-02-10 NOTE — PROGRESS NOTES
Referred for chronic pain due to trauma.  Has history of seizures and MS.  Was apparently prior in pain management, receiving oxycodone for several years, but that provider reportedly changed locations.  Reviewing the prescription database, however, shows that she was on oxycodone 10 mg 4 times per day through Dr. Toribio at [facility] before being switched to Butrans patches in 12/2024.  She recently had a PET scan scheduled for pulmonary nodules, but did not show up to that appointment.    Notably, there is an ED visit from 10/6/2022 where the patient was seen for accidental overdose.  She reported that she had taken 2 of her oxycodone at 1 time, but her urine drug screen was also positive for benzodiazepines and fentanyl.  Also had a urine drug screen positive for cannabinoids in 8/2022.  Per pain management notes, urine drug screen in early 2023 was positive for nonprescribed Xanax.

## 2025-02-12 LAB — DRUGS UR: NORMAL

## 2025-04-16 ENCOUNTER — OFFICE VISIT (OUTPATIENT)
Dept: FAMILY MEDICINE CLINIC | Facility: CLINIC | Age: 40
End: 2025-04-16
Payer: MEDICARE

## 2025-04-16 VITALS
WEIGHT: 270 LBS | OXYGEN SATURATION: 97 % | DIASTOLIC BLOOD PRESSURE: 80 MMHG | SYSTOLIC BLOOD PRESSURE: 128 MMHG | TEMPERATURE: 97.5 F | HEIGHT: 67 IN | BODY MASS INDEX: 42.38 KG/M2 | HEART RATE: 109 BPM

## 2025-04-16 DIAGNOSIS — G35 MULTIPLE SCLEROSIS (HCC): Primary | ICD-10-CM

## 2025-04-16 DIAGNOSIS — E83.51 HYPOCALCEMIA: ICD-10-CM

## 2025-04-16 DIAGNOSIS — R91.8 MULTIPLE PULMONARY NODULES DETERMINED BY COMPUTED TOMOGRAPHY OF LUNG: ICD-10-CM

## 2025-04-16 DIAGNOSIS — Z11.59 NEED FOR HEPATITIS C SCREENING TEST: ICD-10-CM

## 2025-04-16 DIAGNOSIS — G89.4 CHRONIC PAIN SYNDROME: ICD-10-CM

## 2025-04-16 DIAGNOSIS — E66.813 CLASS 3 SEVERE OBESITY WITH SERIOUS COMORBIDITY AND BODY MASS INDEX (BMI) OF 40.0 TO 44.9 IN ADULT, UNSPECIFIED OBESITY TYPE: ICD-10-CM

## 2025-04-16 DIAGNOSIS — Z11.4 SCREENING FOR HIV (HUMAN IMMUNODEFICIENCY VIRUS): ICD-10-CM

## 2025-04-16 DIAGNOSIS — Z13.220 SCREENING FOR LIPID DISORDERS: ICD-10-CM

## 2025-04-16 DIAGNOSIS — Z87.898 HISTORY OF SEIZURES: ICD-10-CM

## 2025-04-16 PROBLEM — E66.9 OBESITY, UNSPECIFIED: Status: ACTIVE | Noted: 2025-04-16

## 2025-04-16 PROBLEM — E66.9 OBESITY, UNSPECIFIED: Status: RESOLVED | Noted: 2025-04-16 | Resolved: 2025-04-16

## 2025-04-16 PROCEDURE — 99215 OFFICE O/P EST HI 40 MIN: CPT

## 2025-04-16 PROCEDURE — G8427 DOCREV CUR MEDS BY ELIG CLIN: HCPCS

## 2025-04-16 PROCEDURE — G2211 COMPLEX E/M VISIT ADD ON: HCPCS

## 2025-04-16 PROCEDURE — G8417 CALC BMI ABV UP PARAM F/U: HCPCS

## 2025-04-16 PROCEDURE — 1036F TOBACCO NON-USER: CPT

## 2025-04-16 RX ORDER — CYCLOBENZAPRINE HCL 10 MG
10 TABLET ORAL 2 TIMES DAILY PRN
Qty: 60 TABLET | Refills: 2 | Status: SHIPPED | OUTPATIENT
Start: 2025-04-16

## 2025-04-16 NOTE — PROGRESS NOTES
Suzan Sanford (: 1985) is a 40 y.o. female, established patient, here for evaluation of the following chief complaint(s):  Follow-up (Needs a different pain management provider; referral to GI for stomach concerns) and Seizures (States she has had 3 since last visit)       ASSESSMENT/PLAN:  1. Multiple sclerosis (HCC)  -     [facility]  2. Hypocalcemia  -     Comprehensive Metabolic Panel; Future  3. Chronic pain syndrome  -     [facility]  -     cyclobenzaprine (FLEXERIL) 10 MG tablet; Take 1 tablet by mouth 2 times daily as needed for Muscle spasms, Disp-60 tablet, R-2Normal  4. Multiple pulmonary nodules determined by computed tomography of lung  -     [facility]  5. Class 3 severe obesity with serious comorbidity and body mass index (BMI) of 40.0 to 44.9 in adult, unspecified obesity type  -     [facility] - Jazz Broderick MD, Bariatric Surgery, [facility]  6. History of seizures  -     [facility]  7. Screening for HIV (human immunodeficiency virus)  -     HIV 1/2 Ag/Ab, 4TH Generation,W Rflx Confirm; Future  8. Need for hepatitis C screening test  -     Hepatitis C Antibody; Future  9. Screening for lipid disorders  -     Lipid Panel; Future    Second Neurology referral placed. She was encouraged to clear her VM box.     Second Pulm referral placed due to new insurance.     Second Pain Mgt referral placed per patient request. I highly advised she significantly reduce Ibuprofen intake due to GI bleed risk and past hx of gastric bypass. She is unable to tolerate Tylenol (per patient). Refilled Flexeril. Advised patient to potentially be open to other options besides opioids.     Bariatric referral placed for obesity consult.     Labs to reassess calcium. May need Endo referral if calcium remains low. Could be potential cause for seizure activity.

## 2025-04-18 ENCOUNTER — LAB (OUTPATIENT)
Dept: FAMILY MEDICINE CLINIC | Facility: CLINIC | Age: 40
End: 2025-04-18

## 2025-04-18 DIAGNOSIS — Z11.59 NEED FOR HEPATITIS C SCREENING TEST: ICD-10-CM

## 2025-04-18 DIAGNOSIS — E83.51 HYPOCALCEMIA: ICD-10-CM

## 2025-04-18 DIAGNOSIS — Z11.4 SCREENING FOR HIV (HUMAN IMMUNODEFICIENCY VIRUS): ICD-10-CM

## 2025-04-18 DIAGNOSIS — Z13.220 SCREENING FOR LIPID DISORDERS: ICD-10-CM

## 2025-04-18 LAB
ALBUMIN SERPL-MCNC: 3.4 G/DL (ref 3.5–5)
ALBUMIN/GLOB SERPL: 0.8 (ref 1–1.9)
ALP SERPL-CCNC: 86 U/L (ref 35–104)
ALT SERPL-CCNC: 15 U/L (ref 8–45)
ANION GAP SERPL CALC-SCNC: 13 MMOL/L (ref 7–16)
AST SERPL-CCNC: 24 U/L (ref 15–37)
BILIRUB SERPL-MCNC: 0.4 MG/DL (ref 0–1.2)
BUN SERPL-MCNC: 14 MG/DL (ref 6–23)
CALCIUM SERPL-MCNC: 7.5 MG/DL (ref 8.8–10.2)
CHLORIDE SERPL-SCNC: 100 MMOL/L (ref 98–107)
CHOLEST SERPL-MCNC: 190 MG/DL (ref 0–200)
CO2 SERPL-SCNC: 25 MMOL/L (ref 20–29)
CREAT SERPL-MCNC: 0.87 MG/DL (ref 0.6–1.1)
GLOBULIN SER CALC-MCNC: 4 G/DL (ref 2.3–3.5)
GLUCOSE SERPL-MCNC: 100 MG/DL (ref 70–99)
HCV AB SER QL: NONREACTIVE
HDLC SERPL-MCNC: 38 MG/DL (ref 40–60)
HDLC SERPL: 5 (ref 0–5)
HIV 1+2 AB+HIV1 P24 AG SERPL QL IA: NONREACTIVE
HIV 1/2 RESULT COMMENT: NORMAL
LDLC SERPL CALC-MCNC: 121 MG/DL (ref 0–100)
POTASSIUM SERPL-SCNC: 4.5 MMOL/L (ref 3.5–5.1)
PROT SERPL-MCNC: 7.3 G/DL (ref 6.3–8.2)
SODIUM SERPL-SCNC: 138 MMOL/L (ref 136–145)
TRIGL SERPL-MCNC: 155 MG/DL (ref 0–150)
VLDLC SERPL CALC-MCNC: 31 MG/DL (ref 6–23)

## 2025-04-21 ENCOUNTER — RESULTS FOLLOW-UP (OUTPATIENT)
Dept: FAMILY MEDICINE CLINIC | Facility: CLINIC | Age: 40
End: 2025-04-21

## 2025-04-21 DIAGNOSIS — E83.51 HYPOCALCEMIA: Primary | ICD-10-CM

## 2025-04-21 NOTE — RESULT ENCOUNTER NOTE
Please call patient and let her know that I will be referring to Endocrinology for further assessment for her low calcium levels. Calcium is at 7.5. She needs to continue taking Calcitriol 2 tablets twice daily.     Her cholesterol and triglycerides are elevated. Recommend watching carbohydrate and saturated fat intake in diet.     HIV/Hep C negative.

## 2025-04-22 ENCOUNTER — TELEPHONE (OUTPATIENT)
Dept: PULMONOLOGY | Age: 40
End: 2025-04-22

## 2025-04-22 NOTE — TELEPHONE ENCOUNTER
Left patient a message to call office.  See previous triage review.  Needs ASAP PET and appointment.

## 2025-04-30 ENCOUNTER — TELEPHONE (OUTPATIENT)
Dept: PULMONOLOGY | Age: 40
End: 2025-04-30

## 2025-04-30 NOTE — TELEPHONE ENCOUNTER
Sana called back and states she will have the Doctor to call back for the Peer to Peer.  Needs to speak with MD/NP or PA

## 2025-04-30 NOTE — TELEPHONE ENCOUNTER
Patient is scheduled for PET scan on 5/6.  Insurance is asking for a Peer to Peer.    Ruth Ann/Miky -[phone]    Tracking # [id number]

## 2025-05-05 NOTE — TELEPHONE ENCOUNTER
Spoke with Gracie, reviewed current status for request 4/29/25 pending denial w/ explanation that more information may be needed, reviewed the records received.    Confirmed most recent CT scan was 2023 w/ multiple nodules w/ hx of thyroidectomy w/ goiter.     Will only do P2P w/ provider, scheduled for 5/6 0915. They will call main office number for  to connect caller to AGUSTO Catherine.

## 2025-05-05 NOTE — TELEPHONE ENCOUNTER
I have attempted to call patient and am not able to leave a message.  I have spoken with Tameesha, EC listed on NIECY.  She is made aware that PET scan ordered by this office is under PEER TO PEER review.  While on phone, attempted to schedule patient for appointment with this office.  She reports that patient is scheduled to see [facility].  I have called that office and per that office, patient is not scheduled there.  EC allows me to schedule patient to see Dr Hurt on 5/13 at 0800 arrival.

## 2025-05-05 NOTE — TELEPHONE ENCOUNTER
Sana needs to know if we can do this Peer to Peer today.  She needs to know this by 2:00 today 5/5

## 2025-05-09 ENCOUNTER — HOSPITAL ENCOUNTER (OUTPATIENT)
Dept: PET IMAGING | Age: 40
Discharge: HOME | End: 2025-05-12
Payer: MEDICARE

## 2025-05-09 DIAGNOSIS — R91.8 PULMONARY NODULES: ICD-10-CM

## 2025-05-09 LAB
GLUCOSE BLD STRIP.AUTO-MCNC: 103 MG/DL (ref 65–100)
SERVICE CMNT-IMP: ABNORMAL

## 2025-05-09 PROCEDURE — A9609 HC RX DIAGNOSTIC RADIOPHARMACEUTICAL: HCPCS | Performed by: INTERNAL MEDICINE

## 2025-05-09 PROCEDURE — 82962 GLUCOSE BLOOD TEST: CPT

## 2025-05-09 PROCEDURE — 6360000004 HC RX CONTRAST MEDICATION: Performed by: INTERNAL MEDICINE

## 2025-05-09 PROCEDURE — 3430000000 HC RX DIAGNOSTIC RADIOPHARMACEUTICAL: Performed by: INTERNAL MEDICINE

## 2025-05-09 PROCEDURE — 2500000003 HC RX 250 WO HCPCS: Performed by: INTERNAL MEDICINE

## 2025-05-09 PROCEDURE — 78815 PET IMAGE W/CT SKULL-THIGH: CPT

## 2025-05-09 RX ORDER — FLUDEOXYGLUCOSE F 18 200 MCI/ML
15.57 INJECTION, SOLUTION INTRAVENOUS
Status: COMPLETED | OUTPATIENT
Start: 2025-05-09 | End: 2025-05-09

## 2025-05-09 RX ORDER — DIATRIZOATE MEGLUMINE AND DIATRIZOATE SODIUM 660; 100 MG/ML; MG/ML
10 SOLUTION ORAL; RECTAL
Status: DISCONTINUED | OUTPATIENT
Start: 2025-05-09 | End: 2025-05-13 | Stop reason: HOSPADM

## 2025-05-09 RX ORDER — SODIUM CHLORIDE 0.9 % (FLUSH) 0.9 %
10 SYRINGE (ML) INJECTION ONCE AS NEEDED
Status: COMPLETED | OUTPATIENT
Start: 2025-05-09 | End: 2025-05-09

## 2025-05-09 RX ADMIN — FLUDEOXYGLUCOSE F 18 15.57 MILLICURIE: 200 INJECTION, SOLUTION INTRAVENOUS at 11:58

## 2025-05-09 RX ADMIN — DIATRIZOATE MEGLUMINE AND DIATRIZOATE SODIUM 10 ML: 660; 100 LIQUID ORAL; RECTAL at 11:58

## 2025-05-09 RX ADMIN — SODIUM CHLORIDE, PRESERVATIVE FREE 10 ML: 5 INJECTION INTRAVENOUS at 11:58

## 2025-05-13 ENCOUNTER — OFFICE VISIT (OUTPATIENT)
Dept: PULMONOLOGY | Age: 40
End: 2025-05-13
Payer: MEDICARE

## 2025-05-13 ENCOUNTER — PREP FOR PROCEDURE (OUTPATIENT)
Dept: PULMONOLOGY | Age: 40
End: 2025-05-13

## 2025-05-13 VITALS
HEART RATE: 92 BPM | SYSTOLIC BLOOD PRESSURE: 130 MMHG | RESPIRATION RATE: 20 BRPM | OXYGEN SATURATION: 99 % | BODY MASS INDEX: 43.55 KG/M2 | HEIGHT: 66 IN | DIASTOLIC BLOOD PRESSURE: 80 MMHG | TEMPERATURE: 98 F | WEIGHT: 271 LBS

## 2025-05-13 DIAGNOSIS — R91.8 PULMONARY NODULES: ICD-10-CM

## 2025-05-13 DIAGNOSIS — Z90.89 HISTORY OF THYROIDECTOMY: ICD-10-CM

## 2025-05-13 DIAGNOSIS — F51.01 PRIMARY INSOMNIA: ICD-10-CM

## 2025-05-13 DIAGNOSIS — R91.8 LUNG NODULES: Primary | ICD-10-CM

## 2025-05-13 DIAGNOSIS — E03.9 HYPOTHYROIDISM (ACQUIRED): ICD-10-CM

## 2025-05-13 DIAGNOSIS — Z86.39 HISTORY OF GOITER: ICD-10-CM

## 2025-05-13 DIAGNOSIS — Z98.890 HISTORY OF THYROIDECTOMY: ICD-10-CM

## 2025-05-13 PROCEDURE — G2211 COMPLEX E/M VISIT ADD ON: HCPCS | Performed by: INTERNAL MEDICINE

## 2025-05-13 PROCEDURE — 99204 OFFICE O/P NEW MOD 45 MIN: CPT | Performed by: INTERNAL MEDICINE

## 2025-05-13 PROCEDURE — G8417 CALC BMI ABV UP PARAM F/U: HCPCS | Performed by: INTERNAL MEDICINE

## 2025-05-13 PROCEDURE — G8427 DOCREV CUR MEDS BY ELIG CLIN: HCPCS | Performed by: INTERNAL MEDICINE

## 2025-05-13 PROCEDURE — 1036F TOBACCO NON-USER: CPT | Performed by: INTERNAL MEDICINE

## 2025-05-13 RX ORDER — SODIUM CHLORIDE 0.9 % (FLUSH) 0.9 %
5-40 SYRINGE (ML) INJECTION PRN
Status: CANCELLED | OUTPATIENT
Start: 2025-05-13

## 2025-05-13 RX ORDER — SODIUM CHLORIDE 0.9 % (FLUSH) 0.9 %
5-40 SYRINGE (ML) INJECTION EVERY 12 HOURS SCHEDULED
Status: CANCELLED | OUTPATIENT
Start: 2025-05-13

## 2025-05-13 RX ORDER — SODIUM CHLORIDE 9 MG/ML
INJECTION, SOLUTION INTRAVENOUS PRN
Status: CANCELLED | OUTPATIENT
Start: 2025-05-13

## 2025-05-13 NOTE — H&P (VIEW-ONLY)
Name:  Suzan Sanford  YOB: 1985   MRN: [id number]      Office Visit: 5/13/2025       Assessment & Plan (Medical Decision Making)    Impression: 40 y.o. female with a complicated past medical history including goiter status post thyroidectomy and parathyroidectomy, lung nodules dating back to 2012 status post what sounds like bronchoscopy attempt at [place] with which is nondiagnostic.  Ongoing slow growth of multiple bilateral pulmonary nodules.  Overall picture is most concerning for a metastatic thyroid cancer.  She has had a CT-guided needle biopsy of thyroid showing atypical cells in the past.  Reviewed most recent imaging with her.  Negative PET scan does not preclude these from being thyroid in nature.  She may benefit from a thyroid scintigraphy study.  Discussed the need for additional biopsy samples either through navigational bronchoscopy or through wedge resection.  She would much prefer a nonsurgical approach.    Also not currently on the thyroid replacement hormone despite having had a total thyroidectomy in the past.  Significant weight gain as part of this as well as chronic pain and insomnia.    -Will set up for navigational bronchoscopy after having a repeat thin slice CT scan for planning purposes.  - She is already scheduled to see endocrinology and has a chronic pain doctor.  - We will refer her to sleep office to manage her insomnia.  - If navigation bronchoscopy is nondiagnostic we will need to pursue a VATS wedge resection one of the more peripheral nodules.  Follow-up with us in 1 month    1. Lung nodules    - [facility], [facility]    2. Primary insomnia      3. History of thyroidectomy    - [facility], [facility]    4. History of goiter      5. Hypothyroidism (acquired)      No orders of the defined types were placed in this encounter.    No orders of the defined types were placed in this encounter.    Follow-up and Dispositions   surgery at [facility] and it was recommended that she undergo a wedge resection of 1 of these lesions but this was never pursued. She was very concerned about how her MS would affect her recovery.     Now referred to us for further evaluation.     No recent CT scans.   PET performed 5/9/25 not yet read but to my review none of these nodules was positive.     She states she has been doing well from a respiratory standpoint.   She has gained over 100 pounds over the past year.   She states she has an appt May 25 to see endocrinology.   She is not currently on thryoid replacement.   She also has issues with back pain.     Never smoker.   No pets in the house.   Has worked doing taxes.       CAT Score:      ACT Score:       No data to display              REVIEW OF SYSTEMS: 10 point review of systems is negative except as reported in HPI.    PHYSICAL EXAM: Body mass index is 43.74 kg/m².  Vitals:    05/13/25 0844   BP: 130/80   Pulse: 92   Resp: 20   Temp: 98 °F (36.7 °C)   TempSrc: Temporal   SpO2: 99%   Weight: 122.9 kg (271 lb)   Height: 1.676 m (5' 6\")         General:   Alert, cooperative, no distress, appears stated age.        Eyes:   Conjunctivae/corneas clear. PERRL        Mouth/Throat:  Lips, mucosa, and tongue normal. Teeth and gums normal.        Lungs:     CTA B, no w/r/r     Heart:   Regular rate and rhythm, S1, S2 normal, no murmur, click, rub or gallop.     Abdomen:    Soft, non-tender.     Extremities:  Extremities normal, atraumatic, no cyanosis or edema.     Skin:  Skin color normal. No rashes or lesions     Neurologic:  A&Ox3     DIAGNOSTIC TESTS:                                                                                    LABS: No results found for: \"WBC\", \"HGB\", \"HCT\", \"PLT\", \"TSH\", \"IGE\", \"NTPROBNP\", \"AMARI\", \"ANCA\", \"RF\", \"ESR\", \"CRP\"  Imaging: I performed an independent interpretation of the patient's images.  CXR:   XR KNEE RIGHT (3 VIEWS)

## 2025-05-13 NOTE — PROGRESS NOTES
There are paired hyperdense or enhancing nodules along the dorsolateral aspect of the hypopharynx bilaterally. The lesion on the right measures 8 x 5 mm and the lesion on the left measures 7 x 16 mm, grossly unchanged. Lobular lesion deep to the left sternocleidomastoid muscle just above the thoracic inlet (embedded in sternothyroid muscle) measures 9 x 22 mm., also similar    Additional Findings:      .  Lymph nodes: No enlarged or morphologically suspicious lymph nodes.    .  Glands: I do not identify a normal thyroid gland in typical location.    .  Vasculature: No acute abnormality.    .  Paranasal sinuses: Aerated.    .  Upper chest: Incompletely visualized subpleural pulmonary nodules left upper lobe redemonstrated.    .  Musculoskeletal: No evidence of acute osseous abnormality.    Impression  1. Lobular enhancing lesion embedded in the lower left sternothyroid muscle corresponds with the hypermetabolic mass described on recent PET/CT. This remains indeterminate and neoplasm is not excluded. Recommend correlation with direct tissue sampling given the presence of numerous suspicious pulmonary nodules.      2. Similar hyperdense nodule anterior to the thyroid gland, most compatible with ectopic thyroid gland.      3. Multiple hyperdense nodules are present in the thyroidectomy bed and along the dorsolateral aspect of the hypopharynx in this patient with a history of thyroidectomy and removal of 2 parathyroid glands. The hyperdensities in the thyroidectomy bed most likely reflect residual glandular tissue in the absence of thyroid malignancy. The more superiorly located nodules are also indeterminate, but could reflect enlarged parathyroid glands (possibly parathyroid adenomas). Recommend correlation with laboratory analysis and consider nuclear medicine evaluation (radioiodine an/or sestamibi studies as indicated).    Signed by: 12/5/2023 8:24 PM: Tory MILLER, Luis Manuel    Nuclear Medicine: No results found for this

## 2025-05-21 ENCOUNTER — TELEPHONE (OUTPATIENT)
Dept: FAMILY MEDICINE CLINIC | Facility: CLINIC | Age: 40
End: 2025-05-21

## 2025-05-21 NOTE — TELEPHONE ENCOUNTER
At Nps request I called patient regarding 4 pm appointment scheduled for this afternoon in our office. Pt wanted to discuss recent PET scan. Seeing as this was not ordered by primary care it is most appropriate for her to follow with pulmonology. Patient stated that she'd already seen pulmonology and didn't understand why there was additional imaging ordered. I tried to clarify with her that the PET scan had not been resulted at the time of her visit with pulmonology. Pt became frustrated and ended call stating she would figure it out. Pt did not verbalize if she wanted to keep appointment scheduled with Daniella today.  I called patient again to clarify if she still wanted to be seen. She apologized for previous conversation and stated she reached out to Dr. Anderson office. She also stated that she does not want to keep appointment today as she has no other complaints other than needing a medication refill. She does not know the name of the medication a this time and stated she would send a mychart request at a later time.

## 2025-05-23 DIAGNOSIS — G47.09 OTHER INSOMNIA: ICD-10-CM

## 2025-05-23 DIAGNOSIS — G89.4 CHRONIC PAIN SYNDROME: ICD-10-CM

## 2025-05-23 RX ORDER — AMITRIPTYLINE HYDROCHLORIDE 10 MG/1
10 TABLET ORAL NIGHTLY
Qty: 90 TABLET | Refills: 0 | Status: SHIPPED | OUTPATIENT
Start: 2025-05-23

## 2025-05-28 ENCOUNTER — TELEMEDICINE (OUTPATIENT)
Dept: FAMILY MEDICINE CLINIC | Facility: CLINIC | Age: 40
End: 2025-05-28
Payer: MEDICARE

## 2025-05-28 DIAGNOSIS — G47.09 OTHER INSOMNIA: Primary | ICD-10-CM

## 2025-05-28 DIAGNOSIS — E20.9 HYPOPARATHYROIDISM, UNSPECIFIED HYPOPARATHYROIDISM TYPE: ICD-10-CM

## 2025-05-28 PROCEDURE — 99214 OFFICE O/P EST MOD 30 MIN: CPT

## 2025-05-28 RX ORDER — ESZOPICLONE 1 MG/1
1 TABLET, FILM COATED ORAL NIGHTLY
Qty: 90 TABLET | Refills: 0 | Status: SHIPPED | OUTPATIENT
Start: 2025-05-28 | End: 2025-08-26

## 2025-05-28 RX ORDER — CALCITRIOL 0.25 UG/1
0.5 CAPSULE, LIQUID FILLED ORAL 2 TIMES DAILY
Qty: 360 CAPSULE | Refills: 5 | Status: SHIPPED | OUTPATIENT
Start: 2025-05-28

## 2025-05-28 NOTE — ASSESSMENT & PLAN NOTE
Orders:    eszopiclone (LUNESTA) 1 MG TABS; Take 1 tablet by mouth nightly for 90 days. Max Daily Amount: 1 mg

## 2025-05-28 NOTE — PROGRESS NOTES
Suzan Sanford  (1985) is an established patient, here for evaluation of the following:    Assessment & Plan   Below is the assessment and plan developed based on review of pertinent history, physical exam, labs, studies, and medications.    Assessment & Plan  Other insomnia       Orders:    eszopiclone (LUNESTA) 1 MG TABS; Take 1 tablet by mouth nightly for 90 days. Max Daily Amount: 1 mg    Hypoparathyroidism, unspecified hypoparathyroidism type       Orders:    calcitRIOL (ROCALTROL) 0.25 MCG capsule; Take 2 capsules by mouth 2 times daily      Insomnia worsening, Elavil 25 mg was not helpful. Start Lunesta 1 mg nightly for at least 5-7 days. Can increase to 2 mg if needed. Keep F/U in 6-7 weeks.     Refill on calcitriol per patient request.     Follow Up    No follow-ups on file.          Subjective     HPI    Insomnia:     Patient endorses continued difficulties with sleeping. Relays she has not slept in 3 days. She tried the increase in Elavil at 25 mg nightly without relief. She has been off this medication for at least 3-4 weeks. She has tried Ambien in the past with relief however I advised patient to try different alternatives given potential adverse effects of Ambien. She has also tried otc measures like melatonin and magnesium without relief.     We discussed trying Lunesta at 1 mg nightly, can increase to 2 mg after 5-7 days if no improvement. Keep F/U in appx 6-7 weeks. We discussed potential adverse effects.     She is also requesting her calcitriol, it appears this needed to be sent to a different pharmacy compared to the last refill. Will send today.       Patient-Reported Vitals      5/21/2025     2:05 PM   Patient-Reported Vitals   Patient-Reported Weight 270   Patient-Reported Height 5'6.5\"   Patient-Reported Systolic 0 mmHg   Patient-Reported Diastolic 0 mmHg   Patient-Reported Pulse 0   Patient-Reported Temperature 0   Patient-Reported SpO2 0   Patient-Reported Peak Flow 0

## 2025-05-30 DIAGNOSIS — E20.9 HYPOPARATHYROIDISM, UNSPECIFIED HYPOPARATHYROIDISM TYPE: ICD-10-CM

## 2025-06-04 ENCOUNTER — TELEPHONE (OUTPATIENT)
Dept: PULMONOLOGY | Age: 40
End: 2025-06-04

## 2025-06-04 RX ORDER — BACLOFEN 10 MG/1
10 TABLET ORAL DAILY PRN
COMMUNITY
Start: 2025-04-09

## 2025-06-04 RX ORDER — OXYCODONE HYDROCHLORIDE 10 MG/1
10 TABLET ORAL EVERY 4 HOURS PRN
COMMUNITY

## 2025-06-04 NOTE — PERIOP NOTE
Patient verified name and .  Order for consent  was found in EHR and does match case posting; patient verifies procedure.   Type 1B surgery, phone assessment complete.  Orders were received.  Labs per surgeon: PAT protocol   Labs per anesthesia protocol: none indicated     Patient answered medical/surgical history questions at their best of ability. All prior to admission medications documented in EPIC.    Patient instructed to continue taking all prescription medications up to the day of surgery but to take only the following medications the day of surgery according to anesthesia guidelines with a small sip of water: Oxycodone IR if needed, baclofen (Lioresal) if needed Also, patient is requested to take 2 Tylenol in the morning and then again before bed on the day before surgery. Regular or extra strength may be used.       Patient informed that all vitamins and supplements should be held 7 days prior to surgery and NSAIDS 5 days prior to surgery. Prescription meds to hold:Cyclobenzaprine (Flexeril)     Patient instructed on the following:    > Arrive at OPC Entrance, time of arrival to be called the day before by 1700  > No food after midnight, patient may drink clear liquids up until 2 hours prior to arrival. No gum, candy, mints.   > Responsible adult must drive patient to the hospital, stay during surgery, and patient will need supervision 24 hours after anesthesia  > Use non moisturizing soap in shower the night before surgery and on the morning of surgery  > All piercings must be removed prior to arrival.    > Leave all valuables (money and jewelry) at home but bring insurance card and ID on DOS.   > You may be required to pay a deductible or co-pay on the day of your procedure. You can pre-pay by calling [phone] if your surgery is at the [facility] or [phone] if your surgery is at the [facility].  > Do not wear make-up, nail polish, lotions, cologne, perfumes, powders, or oil on skin.

## 2025-06-04 NOTE — TELEPHONE ENCOUNTER
Patient wants to cancel procedure for tomorrow 6/5.  States she does not feel comfortable having the procedure tomorrow after the results of PET scan.  Also needs appointment with Dr. Hurt

## 2025-06-04 NOTE — PERIOP NOTE
Preop department called to notify patient of arrival time for scheduled procedure. Instructions given to   - Arrive at OPC Entrance [address].  - Nothing to eat after midnight unless otherwise indicated. No gum, mints, or ice chips. You may have clear liquids two hours prior to arrival to the hospital.   - Have a responsible adult to drive patient to the hospital, stay during surgery, and patient will need supervision 24 hours after anesthesia.   - Use antibacterial soap in shower the night before surgery and on the morning of surgery.       Was patient contacted: yes  Voicemail left: n/a  Numbers contacted: [phone]   Arrival time: 1000

## 2025-06-04 NOTE — TELEPHONE ENCOUNTER
I have attempted to call patient and cannot leave a message.  Spoke with Tameesha, EC listed on [facility]. She is able to put patient on line.  Patient wishes to cancel ION procedure tomorrow as she voices that nothing has changed since last imaging.  We have discussed that only way to determine cause of pulmonary findings is to have tissue. Patient is concerned that BX of pulmonary nodule will case MS chele.  She has not completed CT chest for ION protocol.  She is aware that I will ask either Dr Hurt or Dr Blankenship to contact her as I do not feel comfortable canceling this procedure without her speaking with an MD.

## 2025-06-04 NOTE — TELEPHONE ENCOUNTER
Dr Hurt has contacted patient and she has decided to proceed with ION BX tomorrow.  Patient allows me to reschedule ION protocol CT today at [facility].

## 2025-06-05 ENCOUNTER — HOSPITAL ENCOUNTER (OUTPATIENT)
Age: 40
Setting detail: OUTPATIENT SURGERY
Discharge: HOME | End: 2025-06-05
Attending: STUDENT IN AN ORGANIZED HEALTH CARE EDUCATION/TRAINING PROGRAM | Admitting: STUDENT IN AN ORGANIZED HEALTH CARE EDUCATION/TRAINING PROGRAM
Payer: MEDICARE

## 2025-06-05 ENCOUNTER — ANESTHESIA (OUTPATIENT)
Dept: SURGERY | Age: 40
End: 2025-06-05
Payer: MEDICARE

## 2025-06-05 ENCOUNTER — ANESTHESIA EVENT (OUTPATIENT)
Dept: SURGERY | Age: 40
End: 2025-06-05
Payer: MEDICARE

## 2025-06-05 ENCOUNTER — APPOINTMENT (OUTPATIENT)
Dept: GENERAL RADIOLOGY | Age: 40
End: 2025-06-05
Attending: STUDENT IN AN ORGANIZED HEALTH CARE EDUCATION/TRAINING PROGRAM
Payer: MEDICARE

## 2025-06-05 VITALS
RESPIRATION RATE: 16 BRPM | SYSTOLIC BLOOD PRESSURE: 105 MMHG | HEIGHT: 67 IN | HEART RATE: 78 BPM | TEMPERATURE: 97.9 F | DIASTOLIC BLOOD PRESSURE: 63 MMHG | BODY MASS INDEX: 42.38 KG/M2 | WEIGHT: 270 LBS | OXYGEN SATURATION: 96 %

## 2025-06-05 LAB — HCG UR QL: NEGATIVE

## 2025-06-05 PROCEDURE — 7100000011 HC PHASE II RECOVERY - ADDTL 15 MIN: Performed by: STUDENT IN AN ORGANIZED HEALTH CARE EDUCATION/TRAINING PROGRAM

## 2025-06-05 PROCEDURE — 88334 PATH CONSLTJ SURG CYTO XM EA: CPT

## 2025-06-05 PROCEDURE — 88333 PATH CONSLTJ SURG CYTO XM 1: CPT

## 2025-06-05 PROCEDURE — 2720000010 HC SURG SUPPLY STERILE: Performed by: STUDENT IN AN ORGANIZED HEALTH CARE EDUCATION/TRAINING PROGRAM

## 2025-06-05 PROCEDURE — 88177 CYTP FNA EVAL EA ADDL: CPT

## 2025-06-05 PROCEDURE — 6370000000 HC RX 637 (ALT 250 FOR IP): Performed by: ANESTHESIOLOGY

## 2025-06-05 PROCEDURE — 2580000003 HC RX 258: Performed by: ANESTHESIOLOGY

## 2025-06-05 PROCEDURE — 88172 CYTP DX EVAL FNA 1ST EA SITE: CPT

## 2025-06-05 PROCEDURE — 81025 URINE PREGNANCY TEST: CPT

## 2025-06-05 PROCEDURE — 7100000000 HC PACU RECOVERY - FIRST 15 MIN: Performed by: STUDENT IN AN ORGANIZED HEALTH CARE EDUCATION/TRAINING PROGRAM

## 2025-06-05 PROCEDURE — 2500000003 HC RX 250 WO HCPCS: Performed by: REGISTERED NURSE

## 2025-06-05 PROCEDURE — 3700000001 HC ADD 15 MINUTES (ANESTHESIA): Performed by: STUDENT IN AN ORGANIZED HEALTH CARE EDUCATION/TRAINING PROGRAM

## 2025-06-05 PROCEDURE — 31628 BRONCHOSCOPY/LUNG BX EACH: CPT | Performed by: STUDENT IN AN ORGANIZED HEALTH CARE EDUCATION/TRAINING PROGRAM

## 2025-06-05 PROCEDURE — 31654 BRONCH EBUS IVNTJ PERPH LES: CPT | Performed by: STUDENT IN AN ORGANIZED HEALTH CARE EDUCATION/TRAINING PROGRAM

## 2025-06-05 PROCEDURE — 3700000000 HC ANESTHESIA ATTENDED CARE: Performed by: STUDENT IN AN ORGANIZED HEALTH CARE EDUCATION/TRAINING PROGRAM

## 2025-06-05 PROCEDURE — 31627 NAVIGATIONAL BRONCHOSCOPY: CPT | Performed by: STUDENT IN AN ORGANIZED HEALTH CARE EDUCATION/TRAINING PROGRAM

## 2025-06-05 PROCEDURE — 3609020000 HC BRONCHOSCOPY W/EBUS FNA 3/> NODE: Performed by: STUDENT IN AN ORGANIZED HEALTH CARE EDUCATION/TRAINING PROGRAM

## 2025-06-05 PROCEDURE — 6360000002 HC RX W HCPCS: Performed by: REGISTERED NURSE

## 2025-06-05 PROCEDURE — 31629 BRONCHOSCOPY/NEEDLE BX EACH: CPT | Performed by: STUDENT IN AN ORGANIZED HEALTH CARE EDUCATION/TRAINING PROGRAM

## 2025-06-05 PROCEDURE — 88173 CYTOPATH EVAL FNA REPORT: CPT

## 2025-06-05 PROCEDURE — 2709999900 HC NON-CHARGEABLE SUPPLY: Performed by: STUDENT IN AN ORGANIZED HEALTH CARE EDUCATION/TRAINING PROGRAM

## 2025-06-05 PROCEDURE — 7100000001 HC PACU RECOVERY - ADDTL 15 MIN: Performed by: STUDENT IN AN ORGANIZED HEALTH CARE EDUCATION/TRAINING PROGRAM

## 2025-06-05 PROCEDURE — 88305 TISSUE EXAM BY PATHOLOGIST: CPT

## 2025-06-05 PROCEDURE — 7100000010 HC PHASE II RECOVERY - FIRST 15 MIN: Performed by: STUDENT IN AN ORGANIZED HEALTH CARE EDUCATION/TRAINING PROGRAM

## 2025-06-05 RX ORDER — LIDOCAINE HYDROCHLORIDE 10 MG/ML
1 INJECTION, SOLUTION INFILTRATION; PERINEURAL
Status: DISCONTINUED | OUTPATIENT
Start: 2025-06-05 | End: 2025-06-05 | Stop reason: HOSPADM

## 2025-06-05 RX ORDER — OXYCODONE HYDROCHLORIDE 5 MG/1
5 TABLET ORAL PRN
Status: COMPLETED | OUTPATIENT
Start: 2025-06-05 | End: 2025-06-05

## 2025-06-05 RX ORDER — ACETAMINOPHEN 500 MG
1000 TABLET ORAL ONCE
Status: DISCONTINUED | OUTPATIENT
Start: 2025-06-05 | End: 2025-06-05 | Stop reason: HOSPADM

## 2025-06-05 RX ORDER — ONDANSETRON 2 MG/ML
INJECTION INTRAMUSCULAR; INTRAVENOUS
Status: DISCONTINUED | OUTPATIENT
Start: 2025-06-05 | End: 2025-06-05 | Stop reason: SDUPTHER

## 2025-06-05 RX ORDER — SODIUM CHLORIDE, SODIUM LACTATE, POTASSIUM CHLORIDE, CALCIUM CHLORIDE 600; 310; 30; 20 MG/100ML; MG/100ML; MG/100ML; MG/100ML
INJECTION, SOLUTION INTRAVENOUS CONTINUOUS
Status: DISCONTINUED | OUTPATIENT
Start: 2025-06-05 | End: 2025-06-05 | Stop reason: HOSPADM

## 2025-06-05 RX ORDER — SODIUM CHLORIDE 9 MG/ML
INJECTION, SOLUTION INTRAVENOUS PRN
Status: DISCONTINUED | OUTPATIENT
Start: 2025-06-05 | End: 2025-06-05 | Stop reason: HOSPADM

## 2025-06-05 RX ORDER — ROCURONIUM BROMIDE 10 MG/ML
INJECTION, SOLUTION INTRAVENOUS
Status: DISCONTINUED | OUTPATIENT
Start: 2025-06-05 | End: 2025-06-05 | Stop reason: SDUPTHER

## 2025-06-05 RX ORDER — SODIUM CHLORIDE 0.9 % (FLUSH) 0.9 %
5-40 SYRINGE (ML) INJECTION EVERY 12 HOURS SCHEDULED
Status: DISCONTINUED | OUTPATIENT
Start: 2025-06-05 | End: 2025-06-05 | Stop reason: HOSPADM

## 2025-06-05 RX ORDER — DIPHENHYDRAMINE HYDROCHLORIDE 50 MG/ML
12.5 INJECTION, SOLUTION INTRAMUSCULAR; INTRAVENOUS
Status: DISCONTINUED | OUTPATIENT
Start: 2025-06-05 | End: 2025-06-05 | Stop reason: HOSPADM

## 2025-06-05 RX ORDER — MIDAZOLAM HYDROCHLORIDE 2 MG/2ML
2 INJECTION, SOLUTION INTRAMUSCULAR; INTRAVENOUS
Status: DISCONTINUED | OUTPATIENT
Start: 2025-06-05 | End: 2025-06-05 | Stop reason: HOSPADM

## 2025-06-05 RX ORDER — SODIUM CHLORIDE 0.9 % (FLUSH) 0.9 %
5-40 SYRINGE (ML) INJECTION PRN
Status: DISCONTINUED | OUTPATIENT
Start: 2025-06-05 | End: 2025-06-05 | Stop reason: HOSPADM

## 2025-06-05 RX ORDER — OXYCODONE HYDROCHLORIDE 5 MG/1
10 TABLET ORAL PRN
Status: COMPLETED | OUTPATIENT
Start: 2025-06-05 | End: 2025-06-05

## 2025-06-05 RX ORDER — ONDANSETRON 2 MG/ML
4 INJECTION INTRAMUSCULAR; INTRAVENOUS
Status: DISCONTINUED | OUTPATIENT
Start: 2025-06-05 | End: 2025-06-05 | Stop reason: HOSPADM

## 2025-06-05 RX ORDER — LIDOCAINE HYDROCHLORIDE 20 MG/ML
INJECTION, SOLUTION EPIDURAL; INFILTRATION; INTRACAUDAL; PERINEURAL
Status: DISCONTINUED | OUTPATIENT
Start: 2025-06-05 | End: 2025-06-05 | Stop reason: SDUPTHER

## 2025-06-05 RX ORDER — PROCHLORPERAZINE EDISYLATE 5 MG/ML
5 INJECTION INTRAMUSCULAR; INTRAVENOUS
Status: DISCONTINUED | OUTPATIENT
Start: 2025-06-05 | End: 2025-06-05 | Stop reason: HOSPADM

## 2025-06-05 RX ORDER — DEXAMETHASONE SODIUM PHOSPHATE 10 MG/ML
INJECTION, SOLUTION INTRA-ARTICULAR; INTRALESIONAL; INTRAMUSCULAR; INTRAVENOUS; SOFT TISSUE
Status: DISCONTINUED | OUTPATIENT
Start: 2025-06-05 | End: 2025-06-05 | Stop reason: SDUPTHER

## 2025-06-05 RX ORDER — PROPOFOL 10 MG/ML
INJECTION, EMULSION INTRAVENOUS
Status: DISCONTINUED | OUTPATIENT
Start: 2025-06-05 | End: 2025-06-05 | Stop reason: SDUPTHER

## 2025-06-05 RX ORDER — NALOXONE HYDROCHLORIDE 0.4 MG/ML
INJECTION, SOLUTION INTRAMUSCULAR; INTRAVENOUS; SUBCUTANEOUS PRN
Status: DISCONTINUED | OUTPATIENT
Start: 2025-06-05 | End: 2025-06-05 | Stop reason: HOSPADM

## 2025-06-05 RX ORDER — MIDAZOLAM 1 MG/ML
INJECTION INTRAMUSCULAR; INTRAVENOUS
Status: DISCONTINUED | OUTPATIENT
Start: 2025-06-05 | End: 2025-06-05 | Stop reason: SDUPTHER

## 2025-06-05 RX ADMIN — ONDANSETRON 4 MG: 2 INJECTION INTRAMUSCULAR; INTRAVENOUS at 12:47

## 2025-06-05 RX ADMIN — DEXAMETHASONE SODIUM PHOSPHATE 4 MG: 10 INJECTION INTRAMUSCULAR; INTRAVENOUS at 12:47

## 2025-06-05 RX ADMIN — PHENYLEPHRINE HYDROCHLORIDE 50 MCG: 0.1 INJECTION, SOLUTION INTRAVENOUS at 12:52

## 2025-06-05 RX ADMIN — LIDOCAINE HYDROCHLORIDE 50 MG: 20 INJECTION, SOLUTION EPIDURAL; INFILTRATION; INTRACAUDAL; PERINEURAL at 12:04

## 2025-06-05 RX ADMIN — SUGAMMADEX 50 MG: 100 INJECTION, SOLUTION INTRAVENOUS at 12:59

## 2025-06-05 RX ADMIN — SODIUM CHLORIDE, SODIUM LACTATE, POTASSIUM CHLORIDE, AND CALCIUM CHLORIDE: .6; .31; .03; .02 INJECTION, SOLUTION INTRAVENOUS at 11:18

## 2025-06-05 RX ADMIN — ROCURONIUM BROMIDE 20 MG: 10 INJECTION, SOLUTION INTRAVENOUS at 12:04

## 2025-06-05 RX ADMIN — ROCURONIUM BROMIDE 10 MG: 10 INJECTION, SOLUTION INTRAVENOUS at 12:21

## 2025-06-05 RX ADMIN — SUGAMMADEX 50 MG: 100 INJECTION, SOLUTION INTRAVENOUS at 13:01

## 2025-06-05 RX ADMIN — PHENYLEPHRINE HYDROCHLORIDE 50 MCG: 0.1 INJECTION, SOLUTION INTRAVENOUS at 12:27

## 2025-06-05 RX ADMIN — MIDAZOLAM 2 MG: 1 INJECTION INTRAMUSCULAR; INTRAVENOUS at 11:54

## 2025-06-05 RX ADMIN — PROPOFOL 200 MG: 10 INJECTION, EMULSION INTRAVENOUS at 12:04

## 2025-06-05 RX ADMIN — SUGAMMADEX 50 MG: 100 INJECTION, SOLUTION INTRAVENOUS at 13:00

## 2025-06-05 RX ADMIN — ROCURONIUM BROMIDE 5 MG: 10 INJECTION, SOLUTION INTRAVENOUS at 12:36

## 2025-06-05 RX ADMIN — OXYCODONE 10 MG: 5 TABLET ORAL at 13:19

## 2025-06-05 RX ADMIN — ROCURONIUM BROMIDE 5 MG: 10 INJECTION, SOLUTION INTRAVENOUS at 12:41

## 2025-06-05 ASSESSMENT — PAIN - FUNCTIONAL ASSESSMENT
PAIN_FUNCTIONAL_ASSESSMENT: 0-10
PAIN_FUNCTIONAL_ASSESSMENT: 0-10

## 2025-06-05 ASSESSMENT — LIFESTYLE VARIABLES: SMOKING_STATUS: 0

## 2025-06-05 ASSESSMENT — PAIN SCALES - GENERAL: PAINLEVEL_OUTOF10: 7

## 2025-06-05 ASSESSMENT — PAIN DESCRIPTION - DESCRIPTORS: DESCRIPTORS: ACHING;TENDER;DISCOMFORT

## 2025-06-05 ASSESSMENT — PAIN DESCRIPTION - LOCATION: LOCATION: GENERALIZED

## 2025-06-05 NOTE — OP NOTE
06/05/25  PROCEDURE  Robotic Bronchoscopy of LLL nodules    EQUIPMENT  Olympus  Bronchoscope  ION Robotic Bronchoscope  Olympus [id number] EBUS scope  UM 17/20 Radial probe  ION 21g needle, forceps    INDICATION   Peripheral lung nodules suspicious for malignancy    IMAGING  See H&P    ANESTHESIA  Intubation and general sedation performed by anesthesia. See MAR.     Procedure:  AIRWAY INSPECTION  After obtaining informed consent, an Olympus Bronchoscope was introduced into the trachea through the ETT without complication. This was used to clean the airways, perform visual inspection, and evaluate for any endobronchial lesions.   Airway exam significant for thin secretions throughout airways, no endobronchial lesions    Navigation  CT images acquired with thin slice protocol were used to plan the pathway to target lesion planned using Ion robotic software.  ION Robotic Bronchoscope system was introduced into the airway to identify and biopsy the lesion seen on prior imaging, navigating to the lesion with the aid of fluoroscopy and radial probe US. Visibility with radial US was: concentric:      TBNA and forceps biopsies of the lesion were taken. MARÍA ELENA was present.   Histology from obtained biopsies is pending.    After identifying targets the following samples were obtained:  Location Slide # Sampling technique Findings on MARÍA ELENA   LLL nodule inferior 1-4 TBNA Blood    1-4 Forceps w/ touch prep Blood   LLL largest nodule 1-3 TBNA Atypical cells, blood    1-2 Forceps w/ touch prep Blood     LLL inferior nodule  A total of 4 TBNA and 5 passes with forceps were performed    LLL superior nodule  A total of 4 TBNA and 3 passes with forceps were performed    EBL: 10cc    Complications: none    Diagnosis: Atypical cells    Plan: Await final path      Aldo Blankenship MD

## 2025-06-05 NOTE — INTERVAL H&P NOTE
Update History & Physical    The patient's History and Physical of May 13, 2025 was reviewed with the patient and I examined the patient. There was no change. The surgical site was confirmed by the patient and me.     Plan: The risks, benefits, expected outcome, and alternative to the recommended procedure have been discussed with the patient. Patient understands and wants to proceed with the procedure.     Electronically signed by Aldo Blankenship MD on 6/5/2025 at 11:20 AM

## 2025-06-05 NOTE — ANESTHESIA POSTPROCEDURE EVALUATION
Department of Anesthesiology  Postprocedure Note    Patient: Suzan Sanford  MRN: [id number]  YOB: 1985  Date of evaluation: 6/5/2025    Procedure Summary       Date: 06/05/25 Room / Location: [facility] OP OR 01 / [facility]    Anesthesia Start: 1156 Anesthesia Stop: 1314    Procedure: BRONCHOSCOPY ENDOBRONCHIAL ULTRASOUND FINE NEEDLE ASPIRATION ROBOTIC ION in fluoro copy to Dr Hurt (Chest) Diagnosis:       Pulmonary nodules      (Pulmonary nodules [R91.8])    Surgeons: Aldo Blankenship MD Responsible Provider: Nicholas Tovar MD    Anesthesia Type: General ASA Status: 3            Anesthesia Type: General    Mikayla Phase I: Mikayla Score: 8    Mikayla Phase II: Mikayla Score: 10    Anesthesia Post Evaluation    Patient location during evaluation: PACU  Patient participation: complete - patient participated  Level of consciousness: awake and alert  Airway patency: patent  Nausea & Vomiting: no nausea and no vomiting  Cardiovascular status: hemodynamically stable  Respiratory status: acceptable, nonlabored ventilation and spontaneous ventilation  Hydration status: euvolemic  Comments: /63   Pulse 78   Temp 97.9 °F (36.6 °C) (Temporal)   Resp 16   Ht 1.689 m (5' 6.5\")   Wt 122.5 kg (270 lb)   SpO2 96%   BMI 42.93 kg/m²     Multimodal analgesia pain management approach  Pain management: adequate and satisfactory to patient        No notable events documented.

## 2025-06-05 NOTE — ANESTHESIA PROCEDURE NOTES
Airway  Date/Time: 6/5/2025 12:06 PM  Urgency: elective    Airway not difficult    General Information and Staff    Patient location during procedure: OR  Resident/CRNA: Yo Hanna APRN - CRNA  Performed: resident/CRNA   Performed by: Dian Mota APRN - CRNA  Authorized by: Nicholas Tovar MD      Indications and Patient Condition  Indications for airway management: anesthesia  Spontaneous Ventilation: absent  Sedation level: deep  Preoxygenated: yes  Patient position: sniffing  MILS not maintained throughout  Mask difficulty assessment: vent by bag mask    Final Airway Details  Final airway type: endotracheal airway      Successful airway: ETT  Cuffed: yes   Successful intubation technique: direct laryngoscopy  Facilitating devices/methods: intubating stylet  Endotracheal tube insertion site: oral  Blade: Homero  Blade size: #4  ETT size (mm): 8.5  Cormack-Lehane Classification: grade I - full view of glottis  Placement verified by: chest auscultation and capnometry   Measured from: lips  ETT to lips (cm): 19  Number of attempts at approach: 1  Ventilation between attempts: bag mask  Number of other approaches attempted: 0           Stelara Counseling:  I discussed with the patient the risks of ustekinumab including but not limited to immunosuppression, malignancy, posterior leukoencephalopathy syndrome, and serious infections.  The patient understands that monitoring is required including a PPD at baseline and must alert us or the primary physician if symptoms of infection or other concerning signs are noted.

## 2025-06-05 NOTE — DISCHARGE INSTRUCTIONS

## 2025-06-05 NOTE — ANESTHESIA PRE PROCEDURE
Department of Anesthesiology  Preprocedure Note       Name:  Suzan Sanford   Age:  40 y.o.  :  1985                                          MRN:  [id number]         Date:  2025      Surgeon: Surgeon(s):  Aldo Blankenship MD    Procedure: Procedure(s):  BRONCHOSCOPY ENDOBRONCHIAL ULTRASOUND FINE NEEDLE ASPIRATION ROBOTIC ION in fluoro copy to Dr Hurt    Medications prior to admission:   Prior to Admission medications    Medication Sig Start Date End Date Taking? Authorizing Provider   baclofen (LIORESAL) 10 MG tablet Take 1 tablet by mouth daily as needed 25  Yes Rajesh Kelly MD   oxyCODONE HCl (OXY-IR) 10 MG immediate release tablet Take 1 tablet by mouth every 4 hours as needed for Pain.   Yes Rajesh Kelly MD   calcitRIOL (ROCALTROL) 0.25 MCG capsule Take 2 capsules by mouth 2 times daily 25  Yes Daniella Rodriguez APRN - CNP   eszopiclone (LUNESTA) 1 MG TABS Take 1 tablet by mouth nightly for 90 days. Max Daily Amount: 1 mg 25 Yes Daniella Rodriguez APRN - CNP   cyclobenzaprine (FLEXERIL) 10 MG tablet Take 1 tablet by mouth 2 times daily as needed for Muscle spasms 25  Yes Daniella Rodriguez APRN - CNP   Calcium Carb-Cholecalciferol (OS-MARCOS) 500-600 MG-UNIT CHEW Os-Marcos 500 + D3 500 mg(1,250 mg)-600 unit chewable tablet   TAKE 1 TABLET BY MOUTH TWICE A DAY   Yes Rajesh Kelly MD   docusate (COLACE, DULCOLAX) 100 MG CAPS  3/29/15   Rajesh Kelly MD       Current medications:    Current Facility-Administered Medications   Medication Dose Route Frequency Provider Last Rate Last Admin    lidocaine 1 % injection 1 mL  1 mL IntraDERmal Once PRN Nicholas Tovar MD        acetaminophen (TYLENOL) tablet 1,000 mg  1,000 mg Oral Once Nicholas Tovar MD        lactated ringers infusion   IntraVENous Continuous Nicholas Tovar MD        sodium chloride flush 0.9 % injection 5-40 mL  5-40 mL IntraVENous 2 times per day Nicholas Tovar,

## 2025-06-11 ENCOUNTER — RESULTS FOLLOW-UP (OUTPATIENT)
Dept: PULMONOLOGY | Age: 40
End: 2025-06-11

## 2025-06-11 ENCOUNTER — TELEPHONE (OUTPATIENT)
Dept: FAMILY MEDICINE CLINIC | Facility: CLINIC | Age: 40
End: 2025-06-11

## 2025-06-11 NOTE — TELEPHONE ENCOUNTER
Patient stated that she sent a message through [employer] 3 days ago, but has not gotten a response yet.   Had PET Scan done.  She stated that her next biggest concern is calcium.  She said that she is  having an issue with the  pharmacy, insurance and something is not right with the dates which keeps moving back.   Patient is requesting a call back and asked that this message be listed as Urgent.

## 2025-06-11 NOTE — TELEPHONE ENCOUNTER
Patient is out of the calcium.  She is unable to get her calcium until the 16th.      MA called [facility] in [place].  It is too early with insurance, per [facility].  She had 360 tablets which picked up on 4/11/25.      Insurance will not pay for her next refill until 6/23/25.  They can run it upon the 23 rd  to see if this goes through.     Patient also came in and picked up a one week supply on 6/1/25.     She should be good until the end of the week.  90 days original script she would have run out today.  Then she has the one week supply.  They states she can just wait until the 23rd to  the script.

## 2025-06-11 NOTE — TELEPHONE ENCOUNTER
Daniella Rodriguez, FAM - CNP  [facility] Clinical Staff  Caller: Unspecified (Today,  3:14 PM)  Please call patient back and inquire as to what is happening with her calcium prescription. This was reviewed within the last several weeks, and it appears she should have refills on this. Please clarify with patient.

## 2025-06-13 ENCOUNTER — TELEPHONE (OUTPATIENT)
Dept: PULMONOLOGY | Age: 40
End: 2025-06-13

## 2025-06-13 DIAGNOSIS — R91.8 LUNG NODULES: Primary | ICD-10-CM

## 2025-06-13 DIAGNOSIS — C44.92 SQUAMOUS CELL CARCINOMA OF UNKNOWN ORIGIN: ICD-10-CM

## 2025-06-13 NOTE — TELEPHONE ENCOUNTER
Dr. Hernandez at bedside. Notified that only one troponin level was drawn. MD ok with this, no need for second troponin to be drawn.    Per direct communication with Dr Blankenship, patient needs to be scheduled for brain MRI for staging.  Referral to oncology, NGS testing and to be placed on thoracic conference.   MD approvals discussion of results with patient.      I have spoken with patient. She is aware of results of lung BX.  She is aware that BX has proven squamous cell cancer.  She allows me to schedule a MRI of brain, metal L hip plate is only metallic object.  Referral to medical oncology placed,  request for thoracic conference and [facility] order in [facility].      Patient is aware that this office will follow along until she is scheduled with oncology.

## 2025-06-18 ENCOUNTER — TELEPHONE (OUTPATIENT)
Dept: PULMONOLOGY | Age: 40
End: 2025-06-18

## 2025-06-18 NOTE — TELEPHONE ENCOUNTER
I have spoken with Tameesha EC, she is made aware that oncology has made several attempts to contact patient for an appointment.  She is provided number and will call today for an appointment.

## 2025-06-20 LAB
PD-L1 BY 22C3 TISS IMSTN DOC: NEGATIVE
TEMPUS PD-L1 (22C3) COMBINED POSITIVE SCORE: <1
TEMPUS PD-L1 (22C3) TUMOR PROPORTION SCORE: <1 %
TEMPUS PORTAL: NORMAL

## 2025-06-20 NOTE — TELEPHONE ENCOUNTER
Patient discussed at thoracic conference by Dr Blankenship.  Patient si 41 yo with HX of waxing and waning pulmonary nodules.  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  Per group, in additional to already scheduled appointments, patient needs thyroid u/s and referral to ob/gyn. I have attempted to call both patient and EC, orders in [facility] per Dr Hurt.  Patient is not yet scheduled with oncology.

## 2025-07-11 LAB
DNA RANGE(S) EXAMINED NAR: NORMAL
GENE DIS ANL INTERP-IMP: NORMAL
GENE DIS ASSESSED: NORMAL
PD-L1 BY 22C3 TISS IMSTN DOC: NEGATIVE
REASON FOR STUDY: NORMAL
TEMPUS PD-L1 (22C3) COMBINED POSITIVE SCORE: <1
TEMPUS PD-L1 (22C3) TUMOR PROPORTION SCORE: <1 %
TEMPUS PORTAL: NORMAL

## 2025-07-16 ENCOUNTER — TELEPHONE (OUTPATIENT)
Dept: PULMONOLOGY | Age: 40
End: 2025-07-16

## 2025-07-16 NOTE — TELEPHONE ENCOUNTER
Per direct communication with Dr Blankenship, I have called patient and her EC as patient has not completed oncology appointment. I have left messages for a return call.

## 2025-07-29 NOTE — TELEPHONE ENCOUNTER
I have spoken with patient.  He reports that her MS has been acting up and she has had a lot of personnel issues that she has had to deal with. We have discussed that she needs to complete an appointment with oncology to determine options for treatment.  She allows me to schedule her on Aug 19 at 0830 to see Dr Fonseca.

## 2025-08-15 ENCOUNTER — CLINICAL DOCUMENTATION (OUTPATIENT)
Dept: ONCOLOGY | Age: 40
End: 2025-08-15

## 2025-08-15 ENCOUNTER — CLINICAL DOCUMENTATION (OUTPATIENT)
Dept: CASE MANAGEMENT | Age: 40
End: 2025-08-15

## 2025-08-15 DIAGNOSIS — R69 MULTIPLE COMORBID CONDITIONS: Primary | ICD-10-CM

## 2025-08-15 DIAGNOSIS — R52 PAIN: ICD-10-CM

## 2025-08-15 SDOH — ECONOMIC STABILITY: INCOME INSECURITY: IN THE LAST 12 MONTHS, WAS THERE A TIME WHEN YOU WERE NOT ABLE TO PAY THE MORTGAGE OR RENT ON TIME?: NO

## 2025-08-15 SDOH — ECONOMIC STABILITY: TRANSPORTATION INSECURITY
IN THE PAST 12 MONTHS, HAS LACK OF TRANSPORTATION KEPT YOU FROM MEETINGS, WORK, OR FROM GETTING THINGS NEEDED FOR DAILY LIVING?: NO

## 2025-08-15 SDOH — ECONOMIC STABILITY: TRANSPORTATION INSECURITY
IN THE PAST 12 MONTHS, HAS THE LACK OF TRANSPORTATION KEPT YOU FROM MEDICAL APPOINTMENTS OR FROM GETTING MEDICATIONS?: NO

## 2025-08-15 ASSESSMENT — PATIENT HEALTH QUESTIONNAIRE - PHQ9
SUM OF ALL RESPONSES TO PHQ QUESTIONS 1-9: 0
2. FEELING DOWN, DEPRESSED OR HOPELESS: NOT AT ALL
1. LITTLE INTEREST OR PLEASURE IN DOING THINGS: NOT AT ALL

## 2025-08-15 ASSESSMENT — SOCIAL DETERMINANTS OF HEALTH (SDOH)
WITHIN THE LAST YEAR, HAVE TO BEEN RAPED OR FORCED TO HAVE ANY KIND OF SEXUAL ACTIVITY BY YOUR PARTNER OR EX-PARTNER?: PATIENT UNABLE TO ANSWER
WITHIN THE LAST YEAR, HAVE YOU BEEN KICKED, HIT, SLAPPED, OR OTHERWISE PHYSICALLY HURT BY YOUR PARTNER OR EX-PARTNER?: PATIENT UNABLE TO ANSWER
WITHIN THE LAST YEAR, HAVE YOU BEEN AFRAID OF YOUR PARTNER OR EX-PARTNER?: PATIENT UNABLE TO ANSWER
WITHIN THE LAST YEAR, HAVE YOU BEEN HUMILIATED OR EMOTIONALLY ABUSED IN OTHER WAYS BY YOUR PARTNER OR EX-PARTNER?: PATIENT UNABLE TO ANSWER

## 2025-08-15 ASSESSMENT — LIFESTYLE VARIABLES
HOW OFTEN DO YOU HAVE A DRINK CONTAINING ALCOHOL: NEVER
HOW MANY STANDARD DRINKS CONTAINING ALCOHOL DO YOU HAVE ON A TYPICAL DAY: PATIENT DOES NOT DRINK

## 2025-08-19 ENCOUNTER — CLINICAL DOCUMENTATION (OUTPATIENT)
Dept: CASE MANAGEMENT | Age: 40
End: 2025-08-19

## 2025-08-20 ENCOUNTER — HOSPITAL ENCOUNTER (OUTPATIENT)
Dept: LAB | Age: 40
Discharge: HOME | End: 2025-08-20
Payer: MEDICARE

## 2025-08-20 ENCOUNTER — OFFICE VISIT (OUTPATIENT)
Dept: ONCOLOGY | Age: 40
End: 2025-08-20
Payer: MEDICARE

## 2025-08-20 ENCOUNTER — OFFICE VISIT (OUTPATIENT)
Dept: PALLATIVE CARE | Age: 40
End: 2025-08-20
Payer: MEDICARE

## 2025-08-20 VITALS
HEART RATE: 89 BPM | WEIGHT: 275.2 LBS | RESPIRATION RATE: 18 BRPM | HEIGHT: 66 IN | TEMPERATURE: 99 F | DIASTOLIC BLOOD PRESSURE: 79 MMHG | OXYGEN SATURATION: 99 % | SYSTOLIC BLOOD PRESSURE: 135 MMHG | BODY MASS INDEX: 44.23 KG/M2

## 2025-08-20 DIAGNOSIS — C80.1 METASTATIC SQUAMOUS CELL CARCINOMA INVOLVING LUNG WITH UNKNOWN PRIMARY SITE, UNSPECIFIED LATERALITY (HCC): Primary | ICD-10-CM

## 2025-08-20 DIAGNOSIS — Z98.890 HISTORY OF PARATHYROIDECTOMY: ICD-10-CM

## 2025-08-20 DIAGNOSIS — Z90.89 HISTORY OF TOTAL THYROIDECTOMY: ICD-10-CM

## 2025-08-20 DIAGNOSIS — C78.00 METASTATIC SQUAMOUS CELL CARCINOMA INVOLVING LUNG WITH UNKNOWN PRIMARY SITE, UNSPECIFIED LATERALITY (HCC): Primary | ICD-10-CM

## 2025-08-20 DIAGNOSIS — G35 MULTIPLE SCLEROSIS (HCC): ICD-10-CM

## 2025-08-20 DIAGNOSIS — Z98.890 HISTORY OF TOTAL THYROIDECTOMY: ICD-10-CM

## 2025-08-20 DIAGNOSIS — Z51.5 ENCOUNTER FOR PALLIATIVE CARE: ICD-10-CM

## 2025-08-20 DIAGNOSIS — E07.89 OTHER SPECIFIED DISORDERS OF THYROID: ICD-10-CM

## 2025-08-20 DIAGNOSIS — R56.9 SEIZURES (HCC): ICD-10-CM

## 2025-08-20 DIAGNOSIS — G89.21 CHRONIC PAIN DUE TO TRAUMA: Primary | ICD-10-CM

## 2025-08-20 DIAGNOSIS — Z90.89 HISTORY OF PARATHYROIDECTOMY: ICD-10-CM

## 2025-08-20 DIAGNOSIS — Z87.898 HISTORY OF SEIZURES: ICD-10-CM

## 2025-08-20 DIAGNOSIS — R91.8 PULMONARY NODULES: ICD-10-CM

## 2025-08-20 DIAGNOSIS — C80.1 METASTATIC SQUAMOUS CELL CARCINOMA INVOLVING LUNG WITH UNKNOWN PRIMARY SITE, UNSPECIFIED LATERALITY (HCC): ICD-10-CM

## 2025-08-20 DIAGNOSIS — Z80.9 FAMILY HISTORY OF CANCER: ICD-10-CM

## 2025-08-20 DIAGNOSIS — R91.8 MULTIPLE PULMONARY NODULES DETERMINED BY COMPUTED TOMOGRAPHY OF LUNG: ICD-10-CM

## 2025-08-20 DIAGNOSIS — C78.00 METASTATIC SQUAMOUS CELL CARCINOMA INVOLVING LUNG WITH UNKNOWN PRIMARY SITE, UNSPECIFIED LATERALITY (HCC): ICD-10-CM

## 2025-08-20 LAB
CANCER AG125 SERPL-ACNC: 14 U/ML (ref 0–38)
CEA SERPL-MCNC: 1.6 NG/ML (ref 0–3.8)
Lab: NORMAL
Lab: NORMAL
REFERENCE LAB: NORMAL
T4 FREE SERPL-MCNC: 0.7 NG/DL (ref 0.9–1.7)
TSH, 3RD GENERATION: 6.03 UIU/ML (ref 0.27–4.2)

## 2025-08-20 PROCEDURE — 82378 CARCINOEMBRYONIC ANTIGEN: CPT

## 2025-08-20 PROCEDURE — 86304 IMMUNOASSAY TUMOR CA 125: CPT

## 2025-08-20 PROCEDURE — 84432 ASSAY OF THYROGLOBULIN: CPT

## 2025-08-20 PROCEDURE — 1036F TOBACCO NON-USER: CPT | Performed by: STUDENT IN AN ORGANIZED HEALTH CARE EDUCATION/TRAINING PROGRAM

## 2025-08-20 PROCEDURE — 99205 OFFICE O/P NEW HI 60 MIN: CPT | Performed by: STUDENT IN AN ORGANIZED HEALTH CARE EDUCATION/TRAINING PROGRAM

## 2025-08-20 PROCEDURE — 1036F TOBACCO NON-USER: CPT

## 2025-08-20 PROCEDURE — G8428 CUR MEDS NOT DOCUMENT: HCPCS

## 2025-08-20 PROCEDURE — G8417 CALC BMI ABV UP PARAM F/U: HCPCS

## 2025-08-20 PROCEDURE — G2211 COMPLEX E/M VISIT ADD ON: HCPCS | Performed by: STUDENT IN AN ORGANIZED HEALTH CARE EDUCATION/TRAINING PROGRAM

## 2025-08-20 PROCEDURE — G8417 CALC BMI ABV UP PARAM F/U: HCPCS | Performed by: STUDENT IN AN ORGANIZED HEALTH CARE EDUCATION/TRAINING PROGRAM

## 2025-08-20 PROCEDURE — 86800 THYROGLOBULIN ANTIBODY: CPT

## 2025-08-20 PROCEDURE — 99204 OFFICE O/P NEW MOD 45 MIN: CPT

## 2025-08-20 PROCEDURE — G8428 CUR MEDS NOT DOCUMENT: HCPCS | Performed by: STUDENT IN AN ORGANIZED HEALTH CARE EDUCATION/TRAINING PROGRAM

## 2025-08-20 PROCEDURE — G2212 PROLONG OUTPT/OFFICE VIS: HCPCS | Performed by: STUDENT IN AN ORGANIZED HEALTH CARE EDUCATION/TRAINING PROGRAM

## 2025-08-20 PROCEDURE — 36415 COLL VENOUS BLD VENIPUNCTURE: CPT

## 2025-08-20 PROCEDURE — 84443 ASSAY THYROID STIM HORMONE: CPT

## 2025-08-20 PROCEDURE — 84439 ASSAY OF FREE THYROXINE: CPT

## 2025-08-20 ASSESSMENT — PATIENT HEALTH QUESTIONNAIRE - PHQ9
1. LITTLE INTEREST OR PLEASURE IN DOING THINGS: NOT AT ALL
SUM OF ALL RESPONSES TO PHQ QUESTIONS 1-9: 0
2. FEELING DOWN, DEPRESSED OR HOPELESS: NOT AT ALL
SUM OF ALL RESPONSES TO PHQ QUESTIONS 1-9: 0

## 2025-08-21 LAB
THYROGLOB AB SERPL-ACNC: <1 IU/ML (ref 0–0.9)
THYROGLOBULIN: 2844.9 NG/ML (ref 1.5–38.5)

## 2025-08-22 ENCOUNTER — TELEPHONE (OUTPATIENT)
Dept: ONCOLOGY | Age: 40
End: 2025-08-22

## 2025-08-28 LAB
CARIS HLA-A LIQUID GERMLINE: NORMAL
CARIS HLA-B LIQUID GERMLINE: NORMAL
CARIS HLA-C LIQUID GERMLINE: NORMAL
CARIS MSI - LP EXOME VARIANTS: NOT DETECTED
CARIS TMB - LP EXOME VARIANTS: NORMAL

## 2025-09-02 ENCOUNTER — TELEPHONE (OUTPATIENT)
Dept: ONCOLOGY | Age: 40
End: 2025-09-02

## 2025-09-02 DIAGNOSIS — R91.8 PULMONARY NODULES: Primary | ICD-10-CM

## 2025-09-05 ENCOUNTER — TELEPHONE (OUTPATIENT)
Dept: ONCOLOGY | Age: 40
End: 2025-09-05

## 2025-09-05 DIAGNOSIS — C80.1 METASTATIC SQUAMOUS CELL CARCINOMA INVOLVING LUNG WITH UNKNOWN PRIMARY SITE, UNSPECIFIED LATERALITY (HCC): Primary | ICD-10-CM

## 2025-09-05 DIAGNOSIS — C78.00 METASTATIC SQUAMOUS CELL CARCINOMA INVOLVING LUNG WITH UNKNOWN PRIMARY SITE, UNSPECIFIED LATERALITY (HCC): Primary | ICD-10-CM

## (undated) DEVICE — CATHETER: Brand: ION

## (undated) DEVICE — BIOPSY NEEDLE, 21G: Brand: FLEXISION

## (undated) DEVICE — Device: Brand: ION

## (undated) DEVICE — KIT SURG CUST BRONCHSCP CUST SFD

## (undated) DEVICE — Z ON EXTENDED BACKORDER FORCEPS BX L115CM ALGTR JAW STP DISP

## (undated) DEVICE — SINGLE USE SUCTION VALVE MAJ-209: Brand: SINGLE USE SUCTION VALVE (STERILE)

## (undated) DEVICE — Device: Brand: PORTEX

## (undated) DEVICE — SWIVEL CONNECTOR

## (undated) DEVICE — CATHETER GUIDE

## (undated) DEVICE — VISION PROBE ADAPTER AND SUCTION ADAPTER

## (undated) DEVICE — SINGLE USE BIOPSY VALVE MAJ-210: Brand: SINGLE USE BIOPSY VALVE (STERILE)

## (undated) DEVICE — VISION PROBE: Brand: ION